# Patient Record
Sex: MALE | Race: WHITE | NOT HISPANIC OR LATINO | Employment: FULL TIME | URBAN - METROPOLITAN AREA
[De-identification: names, ages, dates, MRNs, and addresses within clinical notes are randomized per-mention and may not be internally consistent; named-entity substitution may affect disease eponyms.]

---

## 2023-06-02 PROBLEM — E66.9 OBESITY: Status: ACTIVE | Noted: 2023-06-02

## 2023-06-02 PROBLEM — E78.1 HIGH TRIGLYCERIDES: Status: ACTIVE | Noted: 2023-06-02

## 2023-06-02 PROBLEM — R73.01 IFG (IMPAIRED FASTING GLUCOSE): Status: ACTIVE | Noted: 2023-06-02

## 2023-06-02 PROBLEM — N20.0 URIC ACID KIDNEY STONE: Status: ACTIVE | Noted: 2023-06-02

## 2023-06-02 PROBLEM — I10 HTN (HYPERTENSION): Status: ACTIVE | Noted: 2023-06-02

## 2023-06-02 PROBLEM — L98.9 DISORDER OF SKIN OF UPPER EXTREMITY: Status: ACTIVE | Noted: 2023-06-02

## 2023-06-02 PROBLEM — K66.8: Status: ACTIVE | Noted: 2023-06-02

## 2023-06-02 PROBLEM — S61.032A PUNCTURE WOUND OF LEFT THUMB: Status: ACTIVE | Noted: 2023-06-02

## 2023-06-02 PROBLEM — N52.9 ERECTILE DYSFUNCTION: Status: ACTIVE | Noted: 2023-06-02

## 2023-06-02 PROBLEM — E55.9 VITAMIN D DEFICIENCY: Status: ACTIVE | Noted: 2023-06-02

## 2023-06-02 PROBLEM — L98.9 SKIN LESION OF LEFT ARM: Status: ACTIVE | Noted: 2023-06-02

## 2023-06-02 PROBLEM — M10.9 GOUT: Status: ACTIVE | Noted: 2023-06-02

## 2023-06-02 RX ORDER — ALLOPURINOL 100 MG/1
TABLET ORAL
COMMUNITY
Start: 2019-10-29 | End: 2023-06-07 | Stop reason: SDUPTHER

## 2023-06-02 RX ORDER — COLCHICINE 0.6 MG/1
TABLET ORAL
COMMUNITY
Start: 2019-12-03 | End: 2023-06-07 | Stop reason: SDUPTHER

## 2023-06-02 RX ORDER — SILDENAFIL 100 MG/1
TABLET, FILM COATED ORAL
COMMUNITY
End: 2024-06-07 | Stop reason: SINTOL

## 2023-06-02 RX ORDER — AMLODIPINE AND OLMESARTAN MEDOXOMIL 10; 40 MG/1; MG/1
TABLET ORAL
COMMUNITY
End: 2023-06-07 | Stop reason: SDUPTHER

## 2023-06-02 RX ORDER — METOPROLOL SUCCINATE 100 MG/1
TABLET, EXTENDED RELEASE ORAL
COMMUNITY
Start: 2020-10-23 | End: 2023-06-07 | Stop reason: SDUPTHER

## 2023-06-02 RX ORDER — ERGOCALCIFEROL 1.25 MG/1
CAPSULE ORAL
COMMUNITY
Start: 2023-01-08 | End: 2023-06-07 | Stop reason: SDUPTHER

## 2023-06-05 ENCOUNTER — APPOINTMENT (OUTPATIENT)
Dept: PRIMARY CARE | Facility: CLINIC | Age: 43
End: 2023-06-05
Payer: COMMERCIAL

## 2023-06-07 ENCOUNTER — TELEPHONE (OUTPATIENT)
Dept: PRIMARY CARE | Facility: CLINIC | Age: 43
End: 2023-06-07

## 2023-06-07 ENCOUNTER — OFFICE VISIT (OUTPATIENT)
Dept: PRIMARY CARE | Facility: CLINIC | Age: 43
End: 2023-06-07
Payer: COMMERCIAL

## 2023-06-07 VITALS
DIASTOLIC BLOOD PRESSURE: 72 MMHG | SYSTOLIC BLOOD PRESSURE: 110 MMHG | OXYGEN SATURATION: 96 % | HEIGHT: 73 IN | WEIGHT: 315 LBS | BODY MASS INDEX: 41.75 KG/M2 | HEART RATE: 72 BPM

## 2023-06-07 DIAGNOSIS — M10.9 GOUT, UNSPECIFIED CAUSE, UNSPECIFIED CHRONICITY, UNSPECIFIED SITE: ICD-10-CM

## 2023-06-07 DIAGNOSIS — K80.10 CALCULUS OF GALLBLADDER WITH CHOLECYSTITIS WITHOUT BILIARY OBSTRUCTION, UNSPECIFIED CHOLECYSTITIS ACUITY: Primary | ICD-10-CM

## 2023-06-07 DIAGNOSIS — E78.1 HIGH TRIGLYCERIDES: ICD-10-CM

## 2023-06-07 DIAGNOSIS — Z13.29 SCREENING FOR THYROID DISORDER: ICD-10-CM

## 2023-06-07 DIAGNOSIS — R14.0 ABDOMINAL BLOATING: ICD-10-CM

## 2023-06-07 DIAGNOSIS — R73.01 IFG (IMPAIRED FASTING GLUCOSE): ICD-10-CM

## 2023-06-07 DIAGNOSIS — E55.9 VITAMIN D DEFICIENCY: ICD-10-CM

## 2023-06-07 DIAGNOSIS — R10.11 RIGHT UPPER QUADRANT ABDOMINAL PAIN: ICD-10-CM

## 2023-06-07 DIAGNOSIS — R14.2 BELCHING: ICD-10-CM

## 2023-06-07 DIAGNOSIS — I10 HYPERTENSION, UNSPECIFIED TYPE: Primary | ICD-10-CM

## 2023-06-07 PROCEDURE — 1036F TOBACCO NON-USER: CPT | Performed by: NURSE PRACTITIONER

## 2023-06-07 PROCEDURE — 99214 OFFICE O/P EST MOD 30 MIN: CPT | Performed by: NURSE PRACTITIONER

## 2023-06-07 PROCEDURE — 3078F DIAST BP <80 MM HG: CPT | Performed by: NURSE PRACTITIONER

## 2023-06-07 PROCEDURE — 3074F SYST BP LT 130 MM HG: CPT | Performed by: NURSE PRACTITIONER

## 2023-06-07 RX ORDER — COLCHICINE 0.6 MG/1
0.6 TABLET ORAL 2 TIMES DAILY PRN
Qty: 30 TABLET | Refills: 1 | Status: SHIPPED | OUTPATIENT
Start: 2023-06-07 | End: 2024-06-07 | Stop reason: SDUPTHER

## 2023-06-07 RX ORDER — METOPROLOL SUCCINATE 100 MG/1
100 TABLET, EXTENDED RELEASE ORAL DAILY
Qty: 90 TABLET | Refills: 1 | Status: SHIPPED | OUTPATIENT
Start: 2023-06-07 | End: 2023-12-07 | Stop reason: SDUPTHER

## 2023-06-07 RX ORDER — ERGOCALCIFEROL 1.25 MG/1
50000 CAPSULE ORAL
Qty: 12 CAPSULE | Refills: 1 | Status: SHIPPED | OUTPATIENT
Start: 2023-06-07 | End: 2023-12-07 | Stop reason: SDUPTHER

## 2023-06-07 RX ORDER — AMLODIPINE AND OLMESARTAN MEDOXOMIL 10; 40 MG/1; MG/1
1 TABLET ORAL
Qty: 90 TABLET | Refills: 1 | Status: SHIPPED | OUTPATIENT
Start: 2023-06-07 | End: 2023-12-07 | Stop reason: SDUPTHER

## 2023-06-07 RX ORDER — ALLOPURINOL 100 MG/1
200 TABLET ORAL DAILY
Qty: 180 TABLET | Refills: 1 | Status: SHIPPED | OUTPATIENT
Start: 2023-06-07 | End: 2023-12-07 | Stop reason: SDUPTHER

## 2023-06-07 ASSESSMENT — ENCOUNTER SYMPTOMS
NEUROLOGICAL NEGATIVE: 1
CONSTITUTIONAL NEGATIVE: 1
CARDIOVASCULAR NEGATIVE: 1
RESPIRATORY NEGATIVE: 1
PSYCHIATRIC NEGATIVE: 1
ROS GI COMMENTS: AS NOTED IN HPI
MUSCULOSKELETAL NEGATIVE: 1

## 2023-06-07 NOTE — PATIENT INSTRUCTIONS
Labs ordered for next six month appointment- fasting 10-12 hours, do drink water  Recommend low fat diet.   US Gallbladder ordered-> will communicate results and determine the next step

## 2023-06-07 NOTE — TELEPHONE ENCOUNTER
Pharmacy called requesting return phone call. They needed clarification on how pt is to take his rx colchicine. There are 2 different sets of directions? Previously he took 2 tabs at onset but directions are now bid, prn. I can call the pharmacy back to let them know.

## 2023-06-07 NOTE — PROGRESS NOTES
"Subjective   Patient ID: Vitaliy Acosta is a 42 y.o. male who presents for Follow-up (htn) and Med Refill.    HPI   Patient here for routine follow up. Last seen on 12/30/2022.  Current concerns:  1) Sunday night- three days ago. Felt \"like crap\" right sided upper abdominal- under rib of chest pain, did fall asleep, woke up shivering, had headache- went on for 2-3 days then just went away. This was the second time happened, Had happened 4-5 months ago as well. Having increase in bloating abdomin, belching, Does not watch diet- although stopped eating fast food also 4-5 months- d/t cost.  Denies nausea, unsure of any change in BM- never really notices.   Chronic Concerns: Hypertension, Uric acid kidney stones, ED, Gout, Vitamin D deficiency.  Specialist- None  Labs: 12/30/2022  NON SMOKER    Review of Systems   Constitutional: Negative.    HENT: Negative.     Respiratory: Negative.     Cardiovascular: Negative.    Gastrointestinal:         As noted in HPI   Genitourinary: Negative.    Musculoskeletal: Negative.    Neurological: Negative.    Psychiatric/Behavioral: Negative.         Objective   /72 (BP Location: Left arm, Patient Position: Sitting, BP Cuff Size: Large adult)   Pulse 72   Ht 1.854 m (6' 1\")   Wt 145 kg (319 lb)   SpO2 96%   BMI 42.09 kg/m²     Physical Exam  Vitals reviewed.   Constitutional:       Appearance: He is obese.   HENT:      Right Ear: Tympanic membrane, ear canal and external ear normal.      Left Ear: Tympanic membrane, ear canal and external ear normal.      Nose: Nose normal.      Mouth/Throat:      Mouth: Mucous membranes are moist.   Neck:      Thyroid: No thyromegaly.      Vascular: No carotid bruit.   Cardiovascular:      Rate and Rhythm: Normal rate and regular rhythm.      Heart sounds: Normal heart sounds.   Pulmonary:      Effort: Pulmonary effort is normal.      Breath sounds: Normal breath sounds.   Abdominal:      General: Bowel sounds are normal.      Palpations: " Abdomen is soft.      Tenderness: There is abdominal tenderness (deep palpation right upper quadrant- slight tenderness- ambiguous Chavez's sign).      Comments: Limited abdominal exam d/t body habitus.    Musculoskeletal:         General: Normal range of motion.      Cervical back: Normal range of motion and neck supple.   Skin:     General: Skin is warm.   Neurological:      General: No focal deficit present.      Mental Status: He is alert.   Psychiatric:         Mood and Affect: Mood normal.         Behavior: Behavior normal.         Judgment: Judgment normal.       Assessment/Plan   Diagnoses and all orders for this visit:  Health Maintenance  Labs- 12/30/2022- orders placed for next six month labs (12/2023)  Influenza- declines  Prevnar 13/20- not indicated   Shingrix- not indicated   Colonoscopy- screening will be advised at age 45  PSA - not indicated at this time  NON SMOKER    Hypertension, unspecified type- well controlled   Refilled Amlodipine-Olmesartan 10-40 mg once daily & Metoprolol Succinated  mg once daily  -     Comprehensive Metabolic Panel; Future  -     Albumin , Urine Random; Future  -     CBC and Auto Differential; Future  -     metoprolol succinate XL (Toprol-XL) 100 mg 24 hr tablet; Take 1 tablet (100 mg) by mouth once daily.  Gout, unspecified cause, unspecified chronicity, unspecified site  -     Uric acid; Future  -     colchicine 0.6 mg tablet; Take 1 tablet (0.6 mg) by mouth 2 times a day as needed for muscle/joint pain. Take two tablets at onset of symptoms and one tablet one hour later.  -     allopurinol (Zyloprim) 100 mg tablet; Take 2 tablets (200 mg) by mouth once daily.  Right upper quadrant abdominal pain / Belching/ Abdominal bloating  - Recommended low fat diet  -     US gallbladder; Future  IFG (impaired fasting glucose)  -     Comprehensive Metabolic Panel; Future  -     Hemoglobin A1C; Future  High triglycerides  -     Lipid Panel; Future  -     CBC and Auto  Differential; Future  Screening for thyroid disorder  -     TSH with reflex to Free T4 if abnormal; Future  Vitamin D deficiency  -     Vitamin D 1,25 Dihydroxy; Future  -     ergocalciferol (Vitamin D-2) 1.25 MG (34403 UT) capsule; Take 1 capsule (50,000 Units) by mouth 1 (one) time per week.    PLAN/FOLLOW UP SIX MONTHS  Labs to be reviewed at Follow up  US gallbladder, pending results determine if HIDA scan, referral to General Surgeon etc ?

## 2023-08-14 ENCOUNTER — OFFICE VISIT (OUTPATIENT)
Dept: PRIMARY CARE | Facility: CLINIC | Age: 43
End: 2023-08-14
Payer: COMMERCIAL

## 2023-08-14 VITALS
HEART RATE: 61 BPM | DIASTOLIC BLOOD PRESSURE: 72 MMHG | HEIGHT: 73 IN | SYSTOLIC BLOOD PRESSURE: 124 MMHG | OXYGEN SATURATION: 98 % | WEIGHT: 315 LBS | BODY MASS INDEX: 41.75 KG/M2

## 2023-08-14 DIAGNOSIS — Q67.8 CHEST ASYMMETRY: ICD-10-CM

## 2023-08-14 DIAGNOSIS — K80.10 CALCULUS OF GALLBLADDER WITH CHRONIC CHOLECYSTITIS WITHOUT OBSTRUCTION: Primary | ICD-10-CM

## 2023-08-14 DIAGNOSIS — D17.1 LIPOMA OF TORSO: ICD-10-CM

## 2023-08-14 DIAGNOSIS — K21.9 GASTROESOPHAGEAL REFLUX DISEASE, UNSPECIFIED WHETHER ESOPHAGITIS PRESENT: ICD-10-CM

## 2023-08-14 PROBLEM — R14.2 BURPING: Status: ACTIVE | Noted: 2023-08-14

## 2023-08-14 PROBLEM — K76.0 STEATOSIS OF LIVER: Status: ACTIVE | Noted: 2023-08-14

## 2023-08-14 PROBLEM — R14.0 ABDOMINAL BLOATING: Status: ACTIVE | Noted: 2023-08-14

## 2023-08-14 PROBLEM — R10.11 RIGHT UPPER QUADRANT ABDOMINAL PAIN: Status: ACTIVE | Noted: 2023-08-14

## 2023-08-14 PROCEDURE — 99213 OFFICE O/P EST LOW 20 MIN: CPT | Performed by: NURSE PRACTITIONER

## 2023-08-14 PROCEDURE — 3078F DIAST BP <80 MM HG: CPT | Performed by: NURSE PRACTITIONER

## 2023-08-14 PROCEDURE — 3074F SYST BP LT 130 MM HG: CPT | Performed by: NURSE PRACTITIONER

## 2023-08-14 PROCEDURE — 1036F TOBACCO NON-USER: CPT | Performed by: NURSE PRACTITIONER

## 2023-08-14 RX ORDER — OMEPRAZOLE 20 MG/1
20 CAPSULE, DELAYED RELEASE ORAL 2 TIMES DAILY
Qty: 60 CAPSULE | Refills: 0 | Status: SHIPPED | OUTPATIENT
Start: 2023-08-14 | End: 2023-08-23 | Stop reason: SDUPTHER

## 2023-08-14 ASSESSMENT — ENCOUNTER SYMPTOMS
VOMITING: 0
ROS SKIN COMMENTS: AS NOTED IN HPI
DIARRHEA: 0
NAUSEA: 0
CARDIOVASCULAR NEGATIVE: 1
CONSTITUTIONAL NEGATIVE: 1
CONSTIPATION: 0
RESPIRATORY NEGATIVE: 1
ROS GI COMMENTS: AS NOTED IN HPI

## 2023-08-14 ASSESSMENT — PAIN SCALES - GENERAL: PAINLEVEL: 0-NO PAIN

## 2023-08-14 NOTE — PROGRESS NOTES
"Subjective   Patient ID: Vitaliy Acosta is a 43 y.o. male who presents for Abdominal Pain (Inflammation. Pt had an [place]. done 1.5 month ago done and was informed he does have gall stones and was informed to see a surgeon. HE has been trying to follow up a low fat diet. /LOV 6/7/23 /NOV 12/7/23 ).    HPI   Patient here for ongoing concern of abdominal pain. Last seen on 06/07/2023.  Current concern:   1) abdominal pain, US completed 06/07/2023 with a single calcified gallstone, no gallbladder wall thickening.   He did have consultation with General Surgeon Dr. Colin on 06/28/2023 and surgery was discussed. She also felt he would benefit from a PPI or acid reducer for his stomach.  Has been trying to eat low fat diet and not seeing much improvement in symptoms.   2) left sided \"swelling lower rib cage\", bending causes tenderness.  At times it seems larger than \"normal\" and radiated up to left arm-pit for a little while then just resolved on his own, denies any injury or change in color of skin. Has not done anything for it. Just concerned because it is same side of his heart.   Chronic concerns: HTN, uric acid kidney stone , ED, Gout, vitamin D deficiency  Specialist- None  Labs 12/30/2022    Review of Systems   Constitutional: Negative.    Respiratory: Negative.     Cardiovascular: Negative.    Gastrointestinal:  Negative for constipation, diarrhea, nausea and vomiting.        As noted in HPI   Genitourinary: Negative.    Musculoskeletal:         As noted in HPI    Skin:         As noted in HPI       Objective   /72 (BP Location: Left arm, Patient Position: Sitting, BP Cuff Size: Large adult)   Pulse 61   Ht 1.854 m (6' 1\")   Wt 144 kg (317 lb 3.2 oz)   SpO2 98%   BMI 41.85 kg/m²     Physical Exam  Vitals reviewed.   Constitutional:       Appearance: He is obese.   Pulmonary:      Effort: Pulmonary effort is normal.   Chest:      Chest wall: Tenderness (to deep palpation tender) present.   Breasts:     " Right: Normal.      Left: Normal.          Comments: As noted in Diagram palpable rubbery tissue approximately  4 cm high, unable to determine length consistency of lipoma.  No change in skin color or texture within this area.    Musculoskeletal:         General: Normal range of motion.   Lymphadenopathy:      Upper Body:      Left upper body: No supraclavicular, axillary or pectoral adenopathy.   Skin:     General: Skin is warm.   Neurological:      General: No focal deficit present.      Mental Status: He is alert.      Coordination: Coordination is intact.      Gait: Gait is intact.   Psychiatric:         Mood and Affect: Mood normal.         Judgment: Judgment normal.       Assessment/Plan     Diagnoses and all orders for this visit:  Calculus of gallbladder with chronic cholecystitis without obstruction / Gastroesophageal reflux disease, unspecified whether esophagitis present  - initiated  omeprazole (PriLOSEC) 20 mg DR capsule; Take 1 capsule (20 mg) by mouth 2 times a day. Do not crush or chew.  He is to communicate through My Chart if inflammation pain improves with use of Omeprazole, will continue on this medication with plan to decrease to once daily.  Discussed with Vitaliy that likely will need gallbladder surgery since he is having trouble maintaining low fat diet and it does not seem to help his symptoms at this time.   Lipoma of torso /  Chest asymmetry- differential diagnosis lipoma vs rib displacement, likely lipoma based on exam  Discussed lipoma and provided AVS information  Plan: follow up as scheduled in December for routine refills   * communicate through My Chart if gastric inflammation pain improves with use of Omeprazole, will continue on this medication with plan to decrease to once daily.

## 2023-08-14 NOTE — PATIENT INSTRUCTIONS
Omeprazole currently 2 x day for 30 days, the plan is to decrease the dose after 30 days to once a day if needing to stay on this medication.

## 2023-08-23 DIAGNOSIS — K21.9 GASTROESOPHAGEAL REFLUX DISEASE, UNSPECIFIED WHETHER ESOPHAGITIS PRESENT: ICD-10-CM

## 2023-08-23 RX ORDER — OMEPRAZOLE 40 MG/1
40 CAPSULE, DELAYED RELEASE ORAL
Qty: 90 CAPSULE | Refills: 1 | Status: SHIPPED | OUTPATIENT
Start: 2023-08-23 | End: 2023-12-07 | Stop reason: SDUPTHER

## 2023-12-07 ENCOUNTER — OFFICE VISIT (OUTPATIENT)
Dept: PRIMARY CARE | Facility: CLINIC | Age: 43
End: 2023-12-07
Payer: COMMERCIAL

## 2023-12-07 VITALS
HEIGHT: 73 IN | DIASTOLIC BLOOD PRESSURE: 84 MMHG | TEMPERATURE: 97.1 F | BODY MASS INDEX: 41.75 KG/M2 | WEIGHT: 315 LBS | HEART RATE: 64 BPM | OXYGEN SATURATION: 94 % | SYSTOLIC BLOOD PRESSURE: 138 MMHG

## 2023-12-07 DIAGNOSIS — Z12.11 ENCOUNTER FOR SCREENING FOR MALIGNANT NEOPLASM OF COLON: ICD-10-CM

## 2023-12-07 DIAGNOSIS — M10.9 GOUT, UNSPECIFIED CAUSE, UNSPECIFIED CHRONICITY, UNSPECIFIED SITE: ICD-10-CM

## 2023-12-07 DIAGNOSIS — E78.1 HIGH TRIGLYCERIDES: ICD-10-CM

## 2023-12-07 DIAGNOSIS — I10 HYPERTENSION, UNSPECIFIED TYPE: Primary | ICD-10-CM

## 2023-12-07 DIAGNOSIS — E55.9 VITAMIN D DEFICIENCY: ICD-10-CM

## 2023-12-07 DIAGNOSIS — K21.9 GASTROESOPHAGEAL REFLUX DISEASE, UNSPECIFIED WHETHER ESOPHAGITIS PRESENT: ICD-10-CM

## 2023-12-07 DIAGNOSIS — K76.0 STEATOSIS OF LIVER: ICD-10-CM

## 2023-12-07 PROCEDURE — 99214 OFFICE O/P EST MOD 30 MIN: CPT | Performed by: NURSE PRACTITIONER

## 2023-12-07 PROCEDURE — 3075F SYST BP GE 130 - 139MM HG: CPT | Performed by: NURSE PRACTITIONER

## 2023-12-07 PROCEDURE — 1036F TOBACCO NON-USER: CPT | Performed by: NURSE PRACTITIONER

## 2023-12-07 PROCEDURE — 3079F DIAST BP 80-89 MM HG: CPT | Performed by: NURSE PRACTITIONER

## 2023-12-07 RX ORDER — ALLOPURINOL 100 MG/1
200 TABLET ORAL DAILY
Qty: 180 TABLET | Refills: 1 | Status: SHIPPED | OUTPATIENT
Start: 2023-12-07 | End: 2024-05-29 | Stop reason: SDUPTHER

## 2023-12-07 RX ORDER — AMLODIPINE AND OLMESARTAN MEDOXOMIL 10; 40 MG/1; MG/1
1 TABLET ORAL
Qty: 90 TABLET | Refills: 1 | Status: SHIPPED | OUTPATIENT
Start: 2023-12-07 | End: 2024-05-29 | Stop reason: SDUPTHER

## 2023-12-07 RX ORDER — OMEPRAZOLE 20 MG/1
20 CAPSULE, DELAYED RELEASE ORAL
Qty: 90 CAPSULE | Refills: 1 | Status: SHIPPED | OUTPATIENT
Start: 2023-12-07 | End: 2024-06-07 | Stop reason: ALTCHOICE

## 2023-12-07 RX ORDER — ERGOCALCIFEROL 1.25 MG/1
50000 CAPSULE ORAL
Qty: 12 CAPSULE | Refills: 1 | Status: SHIPPED | OUTPATIENT
Start: 2023-12-07 | End: 2024-05-29 | Stop reason: SDUPTHER

## 2023-12-07 RX ORDER — METOPROLOL SUCCINATE 100 MG/1
100 TABLET, EXTENDED RELEASE ORAL DAILY
Qty: 90 TABLET | Refills: 1 | Status: SHIPPED | OUTPATIENT
Start: 2023-12-07 | End: 2024-05-29 | Stop reason: SDUPTHER

## 2023-12-07 ASSESSMENT — ENCOUNTER SYMPTOMS
CONSTITUTIONAL NEGATIVE: 1
PSYCHIATRIC NEGATIVE: 1
RESPIRATORY NEGATIVE: 1
CARDIOVASCULAR NEGATIVE: 1

## 2023-12-07 NOTE — PROGRESS NOTES
"Subjective   Patient ID: Vitaliy Acosta is a 43 y.o. male who presents for Follow-up.    HPI   Patient here for 6 month follow up. Last seen on 08/14/2023- acute concern kidney stones.   Last gout flare-up September, stress eats  Current concern.   1) insurance did not cover Omeprazole so he stopped taking, now using TUMs must    Chronic concerns: HTN, uric acid kidney stone , ED, Gout, vitamin D deficiency  Specialist- None  Labs 12/30/2022- did not complete labs for this appt.     Review of Systems   Constitutional: Negative.    Respiratory: Negative.     Cardiovascular: Negative.    Gastrointestinal:         GERD intermittent with discontinued use of Omeprazole, using TUMS intermittently without significant relief.    Genitourinary: Negative.    Musculoskeletal:         Gout as noted in HPI   Psychiatric/Behavioral: Negative.         Objective   /84   Pulse 64   Temp 36.2 °C (97.1 °F)   Ht 1.854 m (6' 1\")   Wt 146 kg (321 lb)   SpO2 94%   BMI 42.35 kg/m²   Weight in 08/2023 319 lbs.     Physical Exam  Vitals reviewed.   Constitutional:       Appearance: He is obese.   HENT:      Right Ear: Tympanic membrane and ear canal normal.      Left Ear: Tympanic membrane and ear canal normal.      Mouth/Throat:      Mouth: Mucous membranes are moist.   Cardiovascular:      Rate and Rhythm: Normal rate and regular rhythm.      Heart sounds: Normal heart sounds.   Pulmonary:      Effort: Pulmonary effort is normal.      Breath sounds: Normal breath sounds.   Skin:     General: Skin is warm.   Neurological:      General: No focal deficit present.      Mental Status: He is alert.   Psychiatric:         Mood and Affect: Mood normal.         Behavior: Behavior normal.         Judgment: Judgment normal.       Assessment/Plan   Labs- 12/30/2022   Influenza- declines  Prevnar 13/20- not indicated   Shingrix- not indicated   Colonoscopy- referral to General Surgery for screening colonoscopy  PSA - not indicated at this " time  NON SMOKER  Diagnoses and all orders for this visit:  Hypertension, unspecified type- Blood pressure acceptable  -     amlodipine-olmesartan (Lucille) 10-40 mg tablet; Take 1 tablet by mouth once daily.  -     metoprolol succinate XL (Toprol-XL) 100 mg 24 hr tablet; Take 1 tablet (100 mg) by mouth once daily.  Gout, unspecified cause, unspecified chronicity, unspecified site  -     allopurinol (Zyloprim) 100 mg tablet; Take 2 tablets (200 mg) by mouth once daily.  Gastroesophageal reflux disease, unspecified whether esophagitis present  -     omeprazole (PriLOSEC) 20 mg DR capsule; Take 1 capsule (20 mg) by mouth once daily in the morning. Take before meals. Do not crush or chew.  Steatosis of liver  Vitamin D deficiency  -     ergocalciferol (Vitamin D-2) 1.25 MG (91199 UT) capsule; Take 1 capsule (50,000 Units) by mouth 1 (one) time per week.  High triglycerides- no labs  to review  Encounter for screening for malignant neoplasm of colon  -     Referral to General Surgery; Future  Plan: Follow up 6 months  Call if heartburn not improved with restart Omeprazole, plan on EGD when colonoscopy scheduled

## 2023-12-07 NOTE — PATIENT INSTRUCTIONS
Please complete Labs: fasting 10-12 hours do not eat, please drink adequate water prior to lab draw.    Referral for screening colonoscopy, schedule when possible.   Omeprazole 20 mg once daily, if not effective please let me know and will increase dose and plan on upper EGD when you plan on the colonoscopy (as we discussed)

## 2023-12-27 ENCOUNTER — LAB (OUTPATIENT)
Dept: LAB | Facility: LAB | Age: 43
End: 2023-12-27
Payer: COMMERCIAL

## 2023-12-27 DIAGNOSIS — Z13.29 SCREENING FOR THYROID DISORDER: ICD-10-CM

## 2023-12-27 DIAGNOSIS — E78.1 HIGH TRIGLYCERIDES: ICD-10-CM

## 2023-12-27 DIAGNOSIS — I10 HYPERTENSION, UNSPECIFIED TYPE: ICD-10-CM

## 2023-12-27 DIAGNOSIS — R73.01 IFG (IMPAIRED FASTING GLUCOSE): ICD-10-CM

## 2023-12-27 DIAGNOSIS — M10.9 GOUT, UNSPECIFIED CAUSE, UNSPECIFIED CHRONICITY, UNSPECIFIED SITE: ICD-10-CM

## 2023-12-27 DIAGNOSIS — E55.9 VITAMIN D DEFICIENCY: ICD-10-CM

## 2023-12-27 LAB
ALBUMIN SERPL BCP-MCNC: 4.4 G/DL (ref 3.4–5)
ALP SERPL-CCNC: 56 U/L (ref 33–120)
ALT SERPL W P-5'-P-CCNC: 52 U/L (ref 10–52)
ANION GAP SERPL CALC-SCNC: 11 MMOL/L (ref 10–20)
AST SERPL W P-5'-P-CCNC: 45 U/L (ref 9–39)
BASOPHILS # BLD AUTO: 0.05 X10*3/UL (ref 0–0.1)
BASOPHILS NFR BLD AUTO: 0.7 %
BILIRUB SERPL-MCNC: 1 MG/DL (ref 0–1.2)
BUN SERPL-MCNC: 14 MG/DL (ref 6–23)
CALCIUM SERPL-MCNC: 9.4 MG/DL (ref 8.6–10.3)
CHLORIDE SERPL-SCNC: 107 MMOL/L (ref 98–107)
CHOLEST SERPL-MCNC: 137 MG/DL (ref 0–199)
CHOLESTEROL/HDL RATIO: 4.3
CO2 SERPL-SCNC: 23 MMOL/L (ref 21–32)
CREAT SERPL-MCNC: 0.97 MG/DL (ref 0.5–1.3)
EOSINOPHIL # BLD AUTO: 0.11 X10*3/UL (ref 0–0.7)
EOSINOPHIL NFR BLD AUTO: 1.6 %
ERYTHROCYTE [DISTWIDTH] IN BLOOD BY AUTOMATED COUNT: 13.2 % (ref 11.5–14.5)
GFR SERPL CREATININE-BSD FRML MDRD: >90 ML/MIN/1.73M*2
GLUCOSE SERPL-MCNC: 109 MG/DL (ref 74–99)
HCT VFR BLD AUTO: 45.7 % (ref 41–52)
HDLC SERPL-MCNC: 31.6 MG/DL
HGB BLD-MCNC: 15.8 G/DL (ref 13.5–17.5)
IMM GRANULOCYTES # BLD AUTO: 0.02 X10*3/UL (ref 0–0.7)
IMM GRANULOCYTES NFR BLD AUTO: 0.3 % (ref 0–0.9)
LDLC SERPL CALC-MCNC: 78 MG/DL
LYMPHOCYTES # BLD AUTO: 1.64 X10*3/UL (ref 1.2–4.8)
LYMPHOCYTES NFR BLD AUTO: 24.2 %
MCH RBC QN AUTO: 31 PG (ref 26–34)
MCHC RBC AUTO-ENTMCNC: 34.6 G/DL (ref 32–36)
MCV RBC AUTO: 90 FL (ref 80–100)
MONOCYTES # BLD AUTO: 0.53 X10*3/UL (ref 0.1–1)
MONOCYTES NFR BLD AUTO: 7.8 %
NEUTROPHILS # BLD AUTO: 4.44 X10*3/UL (ref 1.2–7.7)
NEUTROPHILS NFR BLD AUTO: 65.4 %
NON HDL CHOLESTEROL: 105 MG/DL (ref 0–149)
NRBC BLD-RTO: 0 /100 WBCS (ref 0–0)
PLATELET # BLD AUTO: 187 X10*3/UL (ref 150–450)
POTASSIUM SERPL-SCNC: 3.9 MMOL/L (ref 3.5–5.3)
PROT SERPL-MCNC: 6.7 G/DL (ref 6.4–8.2)
RBC # BLD AUTO: 5.1 X10*6/UL (ref 4.5–5.9)
SODIUM SERPL-SCNC: 137 MMOL/L (ref 136–145)
TRIGL SERPL-MCNC: 137 MG/DL (ref 0–149)
TSH SERPL-ACNC: 1.04 MIU/L (ref 0.44–3.98)
URATE SERPL-MCNC: 8.5 MG/DL (ref 4–7.5)
VLDL: 27 MG/DL (ref 0–40)
WBC # BLD AUTO: 6.8 X10*3/UL (ref 4.4–11.3)

## 2023-12-27 PROCEDURE — 83036 HEMOGLOBIN GLYCOSYLATED A1C: CPT

## 2023-12-27 PROCEDURE — 82652 VIT D 1 25-DIHYDROXY: CPT

## 2023-12-27 PROCEDURE — 80061 LIPID PANEL: CPT

## 2023-12-27 PROCEDURE — 85025 COMPLETE CBC W/AUTO DIFF WBC: CPT

## 2023-12-27 PROCEDURE — 84550 ASSAY OF BLOOD/URIC ACID: CPT

## 2023-12-27 PROCEDURE — 84443 ASSAY THYROID STIM HORMONE: CPT

## 2023-12-27 PROCEDURE — 80053 COMPREHEN METABOLIC PANEL: CPT

## 2023-12-27 PROCEDURE — 36415 COLL VENOUS BLD VENIPUNCTURE: CPT

## 2023-12-28 LAB
EST. AVERAGE GLUCOSE BLD GHB EST-MCNC: 134 MG/DL
HBA1C MFR BLD: 6.3 %

## 2023-12-29 LAB — 1,25(OH)2D3 SERPL-MCNC: 77.5 PG/ML (ref 19.9–79.3)

## 2024-01-02 ENCOUNTER — OFFICE VISIT (OUTPATIENT)
Dept: PRIMARY CARE | Facility: CLINIC | Age: 44
End: 2024-01-02
Payer: COMMERCIAL

## 2024-01-02 VITALS
DIASTOLIC BLOOD PRESSURE: 78 MMHG | HEART RATE: 56 BPM | OXYGEN SATURATION: 97 % | WEIGHT: 315 LBS | BODY MASS INDEX: 41.75 KG/M2 | HEIGHT: 73 IN | SYSTOLIC BLOOD PRESSURE: 118 MMHG

## 2024-01-02 DIAGNOSIS — R73.03 PREDIABETES: ICD-10-CM

## 2024-01-02 DIAGNOSIS — M10.9 GOUT, UNSPECIFIED CAUSE, UNSPECIFIED CHRONICITY, UNSPECIFIED SITE: ICD-10-CM

## 2024-01-02 DIAGNOSIS — R10.32 LEFT LOWER QUADRANT ABDOMINAL PAIN: ICD-10-CM

## 2024-01-02 DIAGNOSIS — R19.7 DIARRHEA, UNSPECIFIED TYPE: Primary | ICD-10-CM

## 2024-01-02 PROCEDURE — 3074F SYST BP LT 130 MM HG: CPT | Performed by: NURSE PRACTITIONER

## 2024-01-02 PROCEDURE — 1036F TOBACCO NON-USER: CPT | Performed by: NURSE PRACTITIONER

## 2024-01-02 PROCEDURE — 99213 OFFICE O/P EST LOW 20 MIN: CPT | Performed by: NURSE PRACTITIONER

## 2024-01-02 PROCEDURE — 3078F DIAST BP <80 MM HG: CPT | Performed by: NURSE PRACTITIONER

## 2024-01-02 ASSESSMENT — ENCOUNTER SYMPTOMS
BLOOD IN STOOL: 0
DIARRHEA: 1
CHILLS: 0
MUSCULOSKELETAL NEGATIVE: 1
FEVER: 0
NAUSEA: 0
DIAPHORESIS: 0
ABDOMINAL DISTENTION: 0
CARDIOVASCULAR NEGATIVE: 1
RESPIRATORY NEGATIVE: 1
RECTAL PAIN: 0
UNEXPECTED WEIGHT CHANGE: 1
APPETITE CHANGE: 1

## 2024-01-02 NOTE — PATIENT INSTRUCTIONS
Recommend probiotic (over the counter) Florester as directed on label  Limited fatty foods, red sauce, Follow a bland diet- applesauce, rice, toast.   If diarrhea continues or returns please call office office and I will order stool studies.  Uric  acid lab repeat end of March- order in EMR.

## 2024-01-02 NOTE — PROGRESS NOTES
"Subjective   Patient ID: Vitaliy Acosta is a 43 y.o. male who presents for OTHER (Stomach/bladder issues).    HPI   Patient here for ongoing acute concern of stomach/bladder issues. Last office visit on 12/07/2023.   Current concern:  1) Katelyn santos (8 days ago) had \"constant diarrhea with  left sided abdominal \"discomfort- like an internal itch or a pulling feeling\" Used Imodium to help ease-up the diarrhea which did help although when he discontinued use, the diarrhea returned.  Unable to eat any solid foods at that time, able to drink water, Gatorade. Denied cramping, nausea. Today reports able to eat vegetables, water, He also discontinued caffeine. Last BM yesterday am, loose mucous stool.  At his appt on 12/07 rx Omeprazole, had used TUMs as needed piror. He did discontinue use once symptoms started. Has not made appointment with  General surgery for screening colonoscopy as referred from 12/07/2023.   Chronic concerns: HTN, uric acid kidney stone , ED, Gout, vitamin D deficiency, Prediabetes,   Specialist- None  Labs : 12/27/2023     Review of Systems   Constitutional:  Positive for appetite change and unexpected weight change (loss of 5 lbs since 12/07/2023 appt.). Negative for chills, diaphoresis and fever.   HENT: Negative.     Respiratory: Negative.     Cardiovascular: Negative.    Gastrointestinal:  Positive for diarrhea. Negative for abdominal distention, blood in stool, nausea and rectal pain.   Genitourinary: Negative.         Reported with diarrhea had decreased urination    Musculoskeletal: Negative.      Objective   /78 (BP Location: Right arm, Patient Position: Sitting)   Pulse 56   Ht 1.854 m (6' 1\")   Wt 143 kg (316 lb)   SpO2 97%   BMI 41.69 kg/m²   Weight 321 lbs at 12/07/23 appt.  Loss of 5 lbs.     Physical Exam  Vitals reviewed.   Constitutional:       Appearance: He is obese.   Pulmonary:      Effort: Pulmonary effort is normal.   Abdominal:      General: Bowel sounds are " decreased.      Tenderness: There is no abdominal tenderness.      Comments: BS decreased in lower bilateral quadrants, very diminished in Upper bilateral quadrants.    Musculoskeletal:         General: Normal range of motion.   Neurological:      General: No focal deficit present.      Mental Status: He is alert.   Psychiatric:         Mood and Affect: Mood normal.       Assessment/Plan   Labs- 12/27/2023- discussed with patient    Influenza- declines  Prevnar 13/20- not indicated   Shingrix- not indicated   Colonoscopy- referral to General Surgery for screening colonoscopy  PSA - not indicated at this time  NON SMOKER  Diagnoses and all orders for this visit:  Diarrhea, unspecified type / Left lower quadrant abdominal pain: differential dx : diverticulosis vs viral vs gallbladder   Schedule with General surgery for screening colonoscopy soon.   Recommend probiotic (over the counter) Florester as directed on label  Limited fatty foods, red sauce, Follow a bland diet- applesauce, rice, toast. AVS provided bland diet information   If diarrhea continues or returns please call office office and I will order stool studies.  Prediabetes  A1C 6.3, discussed diet, weight loss, exercise to help keep this level in check.   Gout, unspecified cause, unspecified chronicity, unspecified site  Uric acid level 8.5, denies any ongoing flare-ups. Currently on Allopurinol 200 mg, discussed increasing dose if needed, declined   Repeat Uric acid in 3-4 months to determine if level remains elevated or any flare-up will need to increase Allopurinol doseage.   -     Uric acid; Future (plan on repeat  end of March for monitoring)     PLAN: follow up as scheduled June at this time,   If diarrhea returns plan on stool studies  Schedule with General Surgery for screening colonoscopy

## 2024-01-09 ENCOUNTER — APPOINTMENT (OUTPATIENT)
Dept: SURGERY | Facility: CLINIC | Age: 44
End: 2024-01-09
Payer: COMMERCIAL

## 2024-01-11 ENCOUNTER — OFFICE VISIT (OUTPATIENT)
Dept: SURGERY | Facility: CLINIC | Age: 44
End: 2024-01-11
Payer: COMMERCIAL

## 2024-01-11 VITALS
HEIGHT: 73 IN | WEIGHT: 311.8 LBS | OXYGEN SATURATION: 95 % | BODY MASS INDEX: 41.32 KG/M2 | HEART RATE: 90 BPM | DIASTOLIC BLOOD PRESSURE: 95 MMHG | SYSTOLIC BLOOD PRESSURE: 143 MMHG

## 2024-01-11 DIAGNOSIS — Z12.11 ENCOUNTER FOR SCREENING FOR MALIGNANT NEOPLASM OF COLON: Primary | ICD-10-CM

## 2024-01-11 PROCEDURE — 1036F TOBACCO NON-USER: CPT | Performed by: SURGERY

## 2024-01-11 PROCEDURE — 99214 OFFICE O/P EST MOD 30 MIN: CPT | Performed by: SURGERY

## 2024-01-11 PROCEDURE — 3077F SYST BP >= 140 MM HG: CPT | Performed by: SURGERY

## 2024-01-11 PROCEDURE — 3080F DIAST BP >= 90 MM HG: CPT | Performed by: SURGERY

## 2024-01-11 RX ORDER — SODIUM CHLORIDE 9 MG/ML
20 INJECTION, SOLUTION INTRAVENOUS CONTINUOUS
Status: CANCELLED | OUTPATIENT
Start: 2024-01-11

## 2024-01-11 RX ORDER — POLYETHYLENE GLYCOL 3350 17 G/17G
238 POWDER, FOR SOLUTION ORAL ONCE
Qty: 238 G | Refills: 0 | Status: SHIPPED | OUTPATIENT
Start: 2024-01-11 | End: 2024-01-11

## 2024-01-11 ASSESSMENT — ENCOUNTER SYMPTOMS
PALPITATIONS: 0
FEVER: 0
NAUSEA: 0
SHORTNESS OF BREATH: 0
ABDOMINAL PAIN: 0
DIARRHEA: 0
VOMITING: 0
COUGH: 0
CONSTIPATION: 0
HEADACHES: 0
CHILLS: 0
BLOOD IN STOOL: 0
DIZZINESS: 0

## 2024-01-11 NOTE — PROGRESS NOTES
GENERAL SURGERY CLINIC NOTE    Vitaliy Acosta   1980   [id number]     History Of Present Illness  Vitaliy Acosta is a 43 y.o. male who presents to the office for evaluation for his first colonoscopy. He saw Dr. Tom for cholelithiasis last year and is currently asymptomatic from that standpoint. He had diarrhea between Christmas and New Year but that has improved.     Past Medical History  He has a past medical history of Essential (primary) hypertension, Other specified disorders of peritoneum, Personal history of other diseases of the digestive system, and Personal history of urinary calculi.    Surgical History  He has a past surgical history that includes Other surgical history (03/09/2020) and Other surgical history (03/09/2020).  No abdominal surgeries    Medications  Current Outpatient Medications on File Prior to Visit   Medication Sig Dispense Refill    allopurinol (Zyloprim) 100 mg tablet Take 2 tablets (200 mg) by mouth once daily. 180 tablet 1    amlodipine-olmesartan (Lucille) 10-40 mg tablet Take 1 tablet by mouth once daily. 90 tablet 1    colchicine 0.6 mg tablet Take 1 tablet (0.6 mg) by mouth 2 times a day as needed for muscle/joint pain. Take two tablets at onset of symptoms and one tablet one hour later. 30 tablet 1    ergocalciferol (Vitamin D-2) 1.25 MG (29421 UT) capsule Take 1 capsule (50,000 Units) by mouth 1 (one) time per week. 12 capsule 1    metoprolol succinate XL (Toprol-XL) 100 mg 24 hr tablet Take 1 tablet (100 mg) by mouth once daily. 90 tablet 1    omeprazole (PriLOSEC) 20 mg DR capsule Take 1 capsule (20 mg) by mouth once daily in the morning. Take before meals. Do not crush or chew. (Patient not taking: Reported on 1/2/2024) 90 capsule 1    sildenafil (Viagra) 100 mg tablet Take by mouth.       No current facility-administered medications on file prior to visit.       Allergies  Patient has no known allergies.     Social History  He reports that he has never smoked. He has never  used smokeless tobacco. He reports that he does not currently use alcohol. He reports that he does not use drugs.    Family History  No family history on file.     Review of Systems   Constitutional:  Negative for chills and fever.   Respiratory:  Negative for cough and shortness of breath.    Cardiovascular:  Negative for chest pain and palpitations.   Gastrointestinal:  Negative for abdominal pain, blood in stool, constipation, diarrhea, nausea and vomiting.   Neurological:  Negative for dizziness and headaches.   All other systems reviewed and are negative.      Last Recorded Vitals  There were no vitals taken for this visit.     Physical Exam  Constitutional:       General: He is not in acute distress.     Appearance: Normal appearance. He is not ill-appearing.   HENT:      Head: Normocephalic and atraumatic.   Cardiovascular:      Rate and Rhythm: Normal rate and regular rhythm.   Pulmonary:      Effort: Pulmonary effort is normal. No respiratory distress.      Breath sounds: Normal breath sounds.   Abdominal:      General: There is no distension.      Palpations: Abdomen is soft.      Tenderness: There is no abdominal tenderness. There is no guarding.   Musculoskeletal:         General: No swelling.   Skin:     General: Skin is warm and dry.   Neurological:      Mental Status: He is alert and oriented to person, place, and time. Mental status is at baseline.   Psychiatric:         Mood and Affect: Mood normal.         Behavior: Behavior normal.         Assessment and Plan  43 y.o. male who presents for his first colonoscopy. The risks and benefits of undergoing colonoscopy were discussed. Risks include COVID-19 transmission/infection, allergic reactions, heart or lung complications, bleeding, infection, injury to surrounding tissue, and perforation of the colon. The patient expressed understanding and provided informed consent for the procedure.     Colonoscopy in Endoscopy 1/24/24. [facility] [employer] was sent  to his pharmacy.    India Franklin MD, FACS  General Surgery

## 2024-01-11 NOTE — H&P (VIEW-ONLY)
GENERAL SURGERY CLINIC NOTE    Vitaliy Acosta   1980   [id number]     History Of Present Illness  Vitaliy Acosta is a 43 y.o. male who presents to the office for evaluation for his first colonoscopy. He saw Dr. Tom for cholelithiasis last year and is currently asymptomatic from that standpoint. He had diarrhea between Christmas and New Year but that has improved.     Past Medical History  He has a past medical history of Essential (primary) hypertension, Other specified disorders of peritoneum, Personal history of other diseases of the digestive system, and Personal history of urinary calculi.    Surgical History  He has a past surgical history that includes Other surgical history (03/09/2020) and Other surgical history (03/09/2020).  No abdominal surgeries    Medications  Current Outpatient Medications on File Prior to Visit   Medication Sig Dispense Refill    allopurinol (Zyloprim) 100 mg tablet Take 2 tablets (200 mg) by mouth once daily. 180 tablet 1    amlodipine-olmesartan (Lucille) 10-40 mg tablet Take 1 tablet by mouth once daily. 90 tablet 1    colchicine 0.6 mg tablet Take 1 tablet (0.6 mg) by mouth 2 times a day as needed for muscle/joint pain. Take two tablets at onset of symptoms and one tablet one hour later. 30 tablet 1    ergocalciferol (Vitamin D-2) 1.25 MG (51540 UT) capsule Take 1 capsule (50,000 Units) by mouth 1 (one) time per week. 12 capsule 1    metoprolol succinate XL (Toprol-XL) 100 mg 24 hr tablet Take 1 tablet (100 mg) by mouth once daily. 90 tablet 1    omeprazole (PriLOSEC) 20 mg DR capsule Take 1 capsule (20 mg) by mouth once daily in the morning. Take before meals. Do not crush or chew. (Patient not taking: Reported on 1/2/2024) 90 capsule 1    sildenafil (Viagra) 100 mg tablet Take by mouth.       No current facility-administered medications on file prior to visit.       Allergies  Patient has no known allergies.     Social History  He reports that he has never smoked. He has never  used smokeless tobacco. He reports that he does not currently use alcohol. He reports that he does not use drugs.    Family History  No family history on file.     Review of Systems   Constitutional:  Negative for chills and fever.   Respiratory:  Negative for cough and shortness of breath.    Cardiovascular:  Negative for chest pain and palpitations.   Gastrointestinal:  Negative for abdominal pain, blood in stool, constipation, diarrhea, nausea and vomiting.   Neurological:  Negative for dizziness and headaches.   All other systems reviewed and are negative.      Last Recorded Vitals  There were no vitals taken for this visit.     Physical Exam  Constitutional:       General: He is not in acute distress.     Appearance: Normal appearance. He is not ill-appearing.   HENT:      Head: Normocephalic and atraumatic.   Cardiovascular:      Rate and Rhythm: Normal rate and regular rhythm.   Pulmonary:      Effort: Pulmonary effort is normal. No respiratory distress.      Breath sounds: Normal breath sounds.   Abdominal:      General: There is no distension.      Palpations: Abdomen is soft.      Tenderness: There is no abdominal tenderness. There is no guarding.   Musculoskeletal:         General: No swelling.   Skin:     General: Skin is warm and dry.   Neurological:      Mental Status: He is alert and oriented to person, place, and time. Mental status is at baseline.   Psychiatric:         Mood and Affect: Mood normal.         Behavior: Behavior normal.         Assessment and Plan  43 y.o. male who presents for his first colonoscopy. The risks and benefits of undergoing colonoscopy were discussed. Risks include COVID-19 transmission/infection, allergic reactions, heart or lung complications, bleeding, infection, injury to surrounding tissue, and perforation of the colon. The patient expressed understanding and provided informed consent for the procedure.     Colonoscopy in Endoscopy 1/24/24. The WebNotes was sent  to his pharmacy.    India Franklin MD, FACS  General Surgery

## 2024-01-23 ENCOUNTER — ANESTHESIA EVENT (OUTPATIENT)
Dept: GASTROENTEROLOGY | Facility: HOSPITAL | Age: 44
End: 2024-01-23
Payer: COMMERCIAL

## 2024-01-24 ENCOUNTER — HOSPITAL ENCOUNTER (OUTPATIENT)
Dept: GASTROENTEROLOGY | Facility: HOSPITAL | Age: 44
Discharge: HOME | End: 2024-01-24
Payer: COMMERCIAL

## 2024-01-24 ENCOUNTER — ANESTHESIA (OUTPATIENT)
Dept: GASTROENTEROLOGY | Facility: HOSPITAL | Age: 44
End: 2024-01-24
Payer: COMMERCIAL

## 2024-01-24 VITALS
WEIGHT: 300 LBS | RESPIRATION RATE: 18 BRPM | OXYGEN SATURATION: 96 % | BODY MASS INDEX: 39.76 KG/M2 | SYSTOLIC BLOOD PRESSURE: 118 MMHG | TEMPERATURE: 96.9 F | HEIGHT: 73 IN | HEART RATE: 62 BPM | DIASTOLIC BLOOD PRESSURE: 83 MMHG

## 2024-01-24 DIAGNOSIS — Z12.11 ENCOUNTER FOR SCREENING FOR MALIGNANT NEOPLASM OF COLON: Primary | ICD-10-CM

## 2024-01-24 PROBLEM — R11.2 PONV (POSTOPERATIVE NAUSEA AND VOMITING): Status: ACTIVE | Noted: 2024-01-24

## 2024-01-24 PROBLEM — Z98.890 PONV (POSTOPERATIVE NAUSEA AND VOMITING): Status: ACTIVE | Noted: 2024-01-24

## 2024-01-24 PROCEDURE — 3700000002 HC GENERAL ANESTHESIA TIME - EACH INCREMENTAL 1 MINUTE: Performed by: SURGERY

## 2024-01-24 PROCEDURE — 2500000004 HC RX 250 GENERAL PHARMACY W/ HCPCS (ALT 636 FOR OP/ED): Performed by: SURGERY

## 2024-01-24 PROCEDURE — 2500000005 HC RX 250 GENERAL PHARMACY W/O HCPCS: Performed by: NURSE ANESTHETIST, CERTIFIED REGISTERED

## 2024-01-24 PROCEDURE — 7100000010 HC PHASE TWO TIME - EACH INCREMENTAL 1 MINUTE: Performed by: SURGERY

## 2024-01-24 PROCEDURE — 88305 TISSUE EXAM BY PATHOLOGIST: CPT | Performed by: STUDENT IN AN ORGANIZED HEALTH CARE EDUCATION/TRAINING PROGRAM

## 2024-01-24 PROCEDURE — 2500000004 HC RX 250 GENERAL PHARMACY W/ HCPCS (ALT 636 FOR OP/ED): Performed by: NURSE ANESTHETIST, CERTIFIED REGISTERED

## 2024-01-24 PROCEDURE — 88305 TISSUE EXAM BY PATHOLOGIST: CPT | Mod: TC,SUR,PORLAB | Performed by: SURGERY

## 2024-01-24 PROCEDURE — 3700000001 HC GENERAL ANESTHESIA TIME - INITIAL BASE CHARGE: Performed by: SURGERY

## 2024-01-24 PROCEDURE — 7100000009 HC PHASE TWO TIME - INITIAL BASE CHARGE: Performed by: SURGERY

## 2024-01-24 PROCEDURE — 45385 COLONOSCOPY W/LESION REMOVAL: CPT | Performed by: SURGERY

## 2024-01-24 RX ORDER — SODIUM CHLORIDE 9 MG/ML
20 INJECTION, SOLUTION INTRAVENOUS CONTINUOUS
Status: DISCONTINUED | OUTPATIENT
Start: 2024-01-24 | End: 2024-01-25 | Stop reason: HOSPADM

## 2024-01-24 RX ORDER — PROPOFOL 10 MG/ML
INJECTION, EMULSION INTRAVENOUS AS NEEDED
Status: DISCONTINUED | OUTPATIENT
Start: 2024-01-24 | End: 2024-01-24

## 2024-01-24 RX ORDER — LIDOCAINE HYDROCHLORIDE 20 MG/ML
INJECTION, SOLUTION INFILTRATION; PERINEURAL AS NEEDED
Status: DISCONTINUED | OUTPATIENT
Start: 2024-01-24 | End: 2024-01-24

## 2024-01-24 RX ADMIN — PROPOFOL 20 MG: 10 INJECTION, EMULSION INTRAVENOUS at 10:06

## 2024-01-24 RX ADMIN — PROPOFOL 30 MG: 10 INJECTION, EMULSION INTRAVENOUS at 10:04

## 2024-01-24 RX ADMIN — PROPOFOL 30 MG: 10 INJECTION, EMULSION INTRAVENOUS at 10:15

## 2024-01-24 RX ADMIN — PROPOFOL 20 MG: 10 INJECTION, EMULSION INTRAVENOUS at 10:20

## 2024-01-24 RX ADMIN — PROPOFOL 20 MG: 10 INJECTION, EMULSION INTRAVENOUS at 10:10

## 2024-01-24 RX ADMIN — PROPOFOL 20 MG: 10 INJECTION, EMULSION INTRAVENOUS at 10:02

## 2024-01-24 RX ADMIN — PROPOFOL 100 MG: 10 INJECTION, EMULSION INTRAVENOUS at 09:59

## 2024-01-24 RX ADMIN — PROPOFOL 20 MG: 10 INJECTION, EMULSION INTRAVENOUS at 10:08

## 2024-01-24 RX ADMIN — LIDOCAINE HYDROCHLORIDE 50 MG: 20 INJECTION, SOLUTION INFILTRATION; PERINEURAL at 09:59

## 2024-01-24 RX ADMIN — SODIUM CHLORIDE 20 ML/HR: 9 INJECTION, SOLUTION INTRAVENOUS at 09:45

## 2024-01-24 RX ADMIN — PROPOFOL 20 MG: 10 INJECTION, EMULSION INTRAVENOUS at 10:12

## 2024-01-24 RX ADMIN — PROPOFOL 50 MG: 10 INJECTION, EMULSION INTRAVENOUS at 10:00

## 2024-01-24 RX ADMIN — PROPOFOL 20 MG: 10 INJECTION, EMULSION INTRAVENOUS at 10:18

## 2024-01-24 RX ADMIN — PROPOFOL 20 MG: 10 INJECTION, EMULSION INTRAVENOUS at 10:22

## 2024-01-24 SDOH — HEALTH STABILITY: MENTAL HEALTH: CURRENT SMOKER: 0

## 2024-01-24 ASSESSMENT — PAIN SCALES - GENERAL
PAINLEVEL_OUTOF10: 0 - NO PAIN
PAIN_LEVEL: 0
PAINLEVEL_OUTOF10: 0 - NO PAIN

## 2024-01-24 ASSESSMENT — PAIN - FUNCTIONAL ASSESSMENT
PAIN_FUNCTIONAL_ASSESSMENT: 0-10

## 2024-01-24 ASSESSMENT — COLUMBIA-SUICIDE SEVERITY RATING SCALE - C-SSRS
1. IN THE PAST MONTH, HAVE YOU WISHED YOU WERE DEAD OR WISHED YOU COULD GO TO SLEEP AND NOT WAKE UP?: NO
2. HAVE YOU ACTUALLY HAD ANY THOUGHTS OF KILLING YOURSELF?: NO
6. HAVE YOU EVER DONE ANYTHING, STARTED TO DO ANYTHING, OR PREPARED TO DO ANYTHING TO END YOUR LIFE?: NO

## 2024-01-24 NOTE — ANESTHESIA PREPROCEDURE EVALUATION
Patient: Vitaliy Acosta    Procedure Information       Date/Time: 01/24/24 1015    Scheduled providers: India Franklin MD    Procedure: COLONOSCOPY    Location:  [facility]            Relevant Problems   Anesthesia   (+) PONV (postoperative nausea and vomiting)      Cardiovascular   (+) HTN (hypertension)   (+) High triglycerides      Endocrine   (+) Obesity      GI   (+) GERD (gastroesophageal reflux disease)      /Renal   (+) Steatosis of liver   (+) Uric acid kidney stone      GI/Hepatic   (+) Calculus of gallbladder with chronic cholecystitis without obstruction   (+) Steatosis of liver       Clinical information reviewed:   Tobacco  Allergies  Meds   Med Hx  Surg Hx   Fam Hx  Soc Hx        NPO Detail:  NPO/Void Status  Date of Last Liquid: 01/24/24  Time of Last Liquid: 0700  Date of Last Solid: 01/22/24  Last Intake Type: Clear fluids         Physical Exam    Airway  Mallampati: II  TM distance: >3 FB  Neck ROM: full  Comments: Mustache and beard present   Cardiovascular - normal exam     Dental - normal exam     Pulmonary - normal exam     Abdominal            Anesthesia Plan    History of general anesthesia?: yes  History of complications of general anesthesia?: yes    ASA 2     MAC     The patient is not a current smoker.    intravenous induction   Anesthetic plan and risks discussed with patient.  Use of blood products discussed with patient who consented to blood products.    Plan discussed with CRNA.

## 2024-01-24 NOTE — ANESTHESIA POSTPROCEDURE EVALUATION
Patient: Vitaliy Acosta    Procedure Summary       Date: 01/24/24 Room / Location: [facility]    Anesthesia Start: 0955 Anesthesia Stop: 1031    Procedure: COLONOSCOPY Diagnosis:       Encounter for screening for malignant neoplasm of colon      Encounter for screening for malignant neoplasm of colon    Scheduled Providers: India Franklin MD Responsible Provider: PITER Rae    Anesthesia Type: MAC ASA Status: 2            Anesthesia Type: MAC    Vitals Value Taken Time   /78 01/24/24 1029   Temp 36.3 °C (97.4 °F) 01/24/24 1029   Pulse 62 01/24/24 1029   Resp 14 01/24/24 1029   SpO2 97 % 01/24/24 1029       Anesthesia Post Evaluation    Patient location during evaluation: PACU  Patient participation: complete - patient participated  Level of consciousness: sleepy but conscious  Pain score: 0  Pain management: adequate  Airway patency: patent  Cardiovascular status: acceptable and hemodynamically stable  Respiratory status: acceptable, spontaneous ventilation and nasal airway  Hydration status: acceptable  Postoperative Nausea and Vomiting: none        There were no known notable events for this encounter.

## 2024-01-25 NOTE — ADDENDUM NOTE
Encounter addended by: Maria De Jesus Webb RN on: 1/25/2024 2:04 PM   Actions taken: Contacts section saved, Flowsheet accepted

## 2024-01-29 LAB
LABORATORY COMMENT REPORT: NORMAL
PATH REPORT.FINAL DX SPEC: NORMAL
PATH REPORT.GROSS SPEC: NORMAL
PATH REPORT.RELEVANT HX SPEC: NORMAL
PATH REPORT.TOTAL CANCER: NORMAL

## 2024-02-02 ENCOUNTER — TELEPHONE (OUTPATIENT)
Dept: SURGERY | Facility: CLINIC | Age: 44
End: 2024-02-02
Payer: COMMERCIAL

## 2024-02-02 NOTE — TELEPHONE ENCOUNTER
----- Message from India Franklin MD sent at 1/31/2024  9:20 AM EST -----  Please let the patient know the results from his colonoscopy. His next is due in 5 years.

## 2024-05-29 ENCOUNTER — TELEPHONE (OUTPATIENT)
Dept: PRIMARY CARE | Facility: CLINIC | Age: 44
End: 2024-05-29
Payer: COMMERCIAL

## 2024-05-29 DIAGNOSIS — M10.9 GOUT, UNSPECIFIED CAUSE, UNSPECIFIED CHRONICITY, UNSPECIFIED SITE: ICD-10-CM

## 2024-05-29 DIAGNOSIS — E55.9 VITAMIN D DEFICIENCY: ICD-10-CM

## 2024-05-29 DIAGNOSIS — I10 HYPERTENSION, UNSPECIFIED TYPE: ICD-10-CM

## 2024-05-29 RX ORDER — METOPROLOL SUCCINATE 100 MG/1
100 TABLET, EXTENDED RELEASE ORAL DAILY
Qty: 30 TABLET | Refills: 0 | Status: SHIPPED | OUTPATIENT
Start: 2024-05-29 | End: 2024-06-07 | Stop reason: SDUPTHER

## 2024-05-29 RX ORDER — AMLODIPINE AND OLMESARTAN MEDOXOMIL 10; 40 MG/1; MG/1
1 TABLET ORAL
Qty: 30 TABLET | Refills: 0 | Status: SHIPPED | OUTPATIENT
Start: 2024-05-29 | End: 2024-06-07 | Stop reason: SDUPTHER

## 2024-05-29 RX ORDER — ALLOPURINOL 100 MG/1
200 TABLET ORAL DAILY
Qty: 60 TABLET | Refills: 0 | Status: SHIPPED | OUTPATIENT
Start: 2024-05-29 | End: 2024-06-07 | Stop reason: SDUPTHER

## 2024-05-29 RX ORDER — ERGOCALCIFEROL 1.25 MG/1
50000 CAPSULE ORAL
Qty: 4 CAPSULE | Refills: 0 | Status: SHIPPED | OUTPATIENT
Start: 2024-06-02 | End: 2024-06-07 | Stop reason: SDUPTHER

## 2024-05-29 NOTE — TELEPHONE ENCOUNTER
Pt left voice mail stating all of his meds will run out just before his NOV with you on 6/7/24. He will need a short term sent in to his local [facility] on file.     NOV 6/7/24  LOV 1/2/24

## 2024-06-07 ENCOUNTER — TELEPHONE (OUTPATIENT)
Dept: PRIMARY CARE | Facility: CLINIC | Age: 44
End: 2024-06-07

## 2024-06-07 ENCOUNTER — OFFICE VISIT (OUTPATIENT)
Dept: PRIMARY CARE | Facility: CLINIC | Age: 44
End: 2024-06-07
Payer: COMMERCIAL

## 2024-06-07 VITALS
SYSTOLIC BLOOD PRESSURE: 128 MMHG | OXYGEN SATURATION: 94 % | WEIGHT: 315 LBS | BODY MASS INDEX: 41.75 KG/M2 | DIASTOLIC BLOOD PRESSURE: 72 MMHG | HEART RATE: 62 BPM | HEIGHT: 73 IN

## 2024-06-07 DIAGNOSIS — M10.9 GOUT, UNSPECIFIED CAUSE, UNSPECIFIED CHRONICITY, UNSPECIFIED SITE: ICD-10-CM

## 2024-06-07 DIAGNOSIS — E55.9 VITAMIN D DEFICIENCY: ICD-10-CM

## 2024-06-07 DIAGNOSIS — K76.0 STEATOSIS OF LIVER: ICD-10-CM

## 2024-06-07 DIAGNOSIS — E78.1 HIGH TRIGLYCERIDES: ICD-10-CM

## 2024-06-07 DIAGNOSIS — I10 HYPERTENSION, UNSPECIFIED TYPE: Primary | ICD-10-CM

## 2024-06-07 DIAGNOSIS — Z13.29 THYROID DISORDER SCREEN: ICD-10-CM

## 2024-06-07 DIAGNOSIS — R73.03 PREDIABETES: ICD-10-CM

## 2024-06-07 PROCEDURE — 3074F SYST BP LT 130 MM HG: CPT | Performed by: NURSE PRACTITIONER

## 2024-06-07 PROCEDURE — 99214 OFFICE O/P EST MOD 30 MIN: CPT | Performed by: NURSE PRACTITIONER

## 2024-06-07 PROCEDURE — 3078F DIAST BP <80 MM HG: CPT | Performed by: NURSE PRACTITIONER

## 2024-06-07 PROCEDURE — 1036F TOBACCO NON-USER: CPT | Performed by: NURSE PRACTITIONER

## 2024-06-07 RX ORDER — METOPROLOL SUCCINATE 100 MG/1
100 TABLET, EXTENDED RELEASE ORAL DAILY
Qty: 90 TABLET | Refills: 3 | Status: SHIPPED | OUTPATIENT
Start: 2024-06-07

## 2024-06-07 RX ORDER — COLCHICINE 0.6 MG/1
0.6 TABLET ORAL 2 TIMES DAILY PRN
Qty: 30 TABLET | Refills: 1 | Status: SHIPPED | OUTPATIENT
Start: 2024-06-07 | End: 2024-06-07 | Stop reason: SDUPTHER

## 2024-06-07 RX ORDER — ALLOPURINOL 100 MG/1
200 TABLET ORAL DAILY
Qty: 180 TABLET | Refills: 3 | Status: SHIPPED | OUTPATIENT
Start: 2024-06-07

## 2024-06-07 RX ORDER — ERGOCALCIFEROL 1.25 MG/1
50000 CAPSULE ORAL
Qty: 12 CAPSULE | Refills: 3 | Status: SHIPPED | OUTPATIENT
Start: 2024-06-09

## 2024-06-07 RX ORDER — COLCHICINE 0.6 MG/1
1.2 TABLET ORAL SEE ADMIN INSTRUCTIONS
Qty: 30 TABLET | Refills: 1 | Status: SHIPPED | OUTPATIENT
Start: 2024-06-07

## 2024-06-07 RX ORDER — AMLODIPINE AND OLMESARTAN MEDOXOMIL 10; 40 MG/1; MG/1
1 TABLET ORAL
Qty: 90 TABLET | Refills: 3 | Status: SHIPPED | OUTPATIENT
Start: 2024-06-07

## 2024-06-07 ASSESSMENT — ENCOUNTER SYMPTOMS
CARDIOVASCULAR NEGATIVE: 1
BACK PAIN: 1
NEUROLOGICAL NEGATIVE: 1
RESPIRATORY NEGATIVE: 1
FATIGUE: 1
DECREASED CONCENTRATION: 1

## 2024-06-07 NOTE — PATIENT INSTRUCTIONS
Lab orders to be completed for next appointment review. Labs are fasting 10-12 hours do not eat, please drink adequate water prior to lab draw.

## 2024-06-07 NOTE — PROGRESS NOTES
"Subjective   Patient ID: Vitaliy Acosta is a 43 y.o. male who presents for Follow-up (6m ).    HPI   Patient here for routine follow up. Last office visit acute on 01/02/2024  Current concerns  Chronic concerns: HTN, uric acid kidney stone , ED, Gout, vitamin D deficiency, Prediabetes, Diverticulosis   Specialist- None  Labs : 12/27/2023 - Need to update  NON SMOKER    Review of Systems   Constitutional:  Positive for fatigue.   Respiratory: Negative.     Cardiovascular: Negative.    Gastrointestinal:         Unable to eat a lot of anything    Genitourinary: Negative.    Musculoskeletal:  Positive for back pain.   Neurological: Negative.    Psychiatric/Behavioral:  Positive for decreased concentration (hard to stay focus).      Objective   /72 (BP Location: Right arm, Patient Position: Sitting, BP Cuff Size: Large adult)   Pulse 62   Ht 1.854 m (6' 1\")   Wt 144 kg (316 lb 6.4 oz)   SpO2 94%   BMI 41.74 kg/m²   Weight in January 316 lbs     Physical Exam  Vitals reviewed.   Constitutional:       Appearance: He is obese.   Neck:      Thyroid: No thyromegaly.      Vascular: No carotid bruit.   Cardiovascular:      Rate and Rhythm: Normal rate and regular rhythm.      Heart sounds: Normal heart sounds.   Pulmonary:      Effort: Pulmonary effort is normal.      Breath sounds: Normal breath sounds.   Abdominal:      General: Bowel sounds are normal.      Palpations: Abdomen is soft.      Tenderness: There is no abdominal tenderness.   Musculoskeletal:      Cervical back: Neck supple.   Skin:     General: Skin is warm.   Neurological:      General: No focal deficit present.      Mental Status: He is alert.      Coordination: Coordination is intact.      Gait: Gait is intact.   Psychiatric:         Attention and Perception: Attention normal.         Speech: Speech normal.         Behavior: Behavior normal. Behavior is cooperative.       Assessment/Plan     Labs- 12/27/2023    Influenza- declines  Prevnar 13/20- " not indicated   Shingrix- not indicated   Colonoscopy- 01/24/2024-General Surgery (Dr Franklin) diverticulosis mild & sub centimeter polyp.  PSA - not indicated at this time  Diagnoses and all orders for this visit:  Hypertension, unspecified type- well controlled   - refilled metoprolol succinate XL (Toprol-XL) 100 mg 24 hr tablet; Take 1 tablet (100 mg) by mouth once daily.  - refilled  amlodipine-olmesartan (Lucille) 10-40 mg tablet; Take 1 tablet by mouth once daily.  -     CBC and Auto Differential; Future  -     Albumin , Urine Random; Future  Gout, unspecified cause, unspecified chronicity, unspecified site  - refilled  allopurinol (Zyloprim) 100 mg tablet; Take 2 tablets (200 mg) by mouth once daily.  -     Uric acid; Future  Vitamin D deficiency  -  refilled  ergocalciferol (Vitamin D-2) 1.25 MG (04760 UT) capsule; Take 1 capsule (50,000 Units) by mouth 1 (one) time per week.  -     Vitamin D 25-Hydroxy,Total (for eval of Vitamin D levels); Future  Prediabetes  -     Hemoglobin A1C; Future  High triglycerides  -     Lipid Panel; Future  Steatosis of liver  -     Comprehensive Metabolic Panel; Future  Thyroid disorder screen  -     TSH with reflex to Free T4 if abnormal; Future     PLAN: follow up 6 months  Labs to be completed for review at next appt.

## 2024-06-07 NOTE — TELEPHONE ENCOUNTER
Pharmacy called stating there's 2 different directions on patients rx Colchicine. They would like to know which set of directions they're to put on the label.     Take 1 tab PO BID, PRN  Or   Take 2 tabs at onset of sx and 1 tab PO 1 hour later?

## 2024-09-10 ENCOUNTER — APPOINTMENT (OUTPATIENT)
Dept: PRIMARY CARE | Facility: CLINIC | Age: 44
End: 2024-09-10
Payer: COMMERCIAL

## 2024-09-19 ENCOUNTER — APPOINTMENT (OUTPATIENT)
Dept: PRIMARY CARE | Facility: CLINIC | Age: 44
End: 2024-09-19
Payer: COMMERCIAL

## 2024-12-06 ENCOUNTER — APPOINTMENT (OUTPATIENT)
Dept: PRIMARY CARE | Facility: CLINIC | Age: 44
End: 2024-12-06
Payer: COMMERCIAL

## 2024-12-10 ENCOUNTER — LAB (OUTPATIENT)
Dept: LAB | Facility: LAB | Age: 44
End: 2024-12-10
Payer: COMMERCIAL

## 2024-12-10 DIAGNOSIS — I10 HYPERTENSION, UNSPECIFIED TYPE: ICD-10-CM

## 2024-12-10 DIAGNOSIS — E55.9 VITAMIN D DEFICIENCY: ICD-10-CM

## 2024-12-10 DIAGNOSIS — M10.9 GOUT, UNSPECIFIED CAUSE, UNSPECIFIED CHRONICITY, UNSPECIFIED SITE: ICD-10-CM

## 2024-12-10 DIAGNOSIS — R73.03 PREDIABETES: ICD-10-CM

## 2024-12-10 DIAGNOSIS — K76.0 STEATOSIS OF LIVER: ICD-10-CM

## 2024-12-10 DIAGNOSIS — Z13.29 THYROID DISORDER SCREEN: ICD-10-CM

## 2024-12-10 DIAGNOSIS — E78.1 HIGH TRIGLYCERIDES: ICD-10-CM

## 2024-12-10 LAB
25(OH)D3 SERPL-MCNC: 19 NG/ML (ref 30–100)
ALBUMIN SERPL BCP-MCNC: 4.4 G/DL (ref 3.4–5)
ALP SERPL-CCNC: 61 U/L (ref 33–120)
ALT SERPL W P-5'-P-CCNC: 60 U/L (ref 10–52)
ANION GAP SERPL CALC-SCNC: 13 MMOL/L (ref 10–20)
AST SERPL W P-5'-P-CCNC: 43 U/L (ref 9–39)
BASOPHILS # BLD AUTO: 0.02 X10*3/UL (ref 0–0.1)
BASOPHILS NFR BLD AUTO: 0.4 %
BILIRUB SERPL-MCNC: 0.8 MG/DL (ref 0–1.2)
BUN SERPL-MCNC: 13 MG/DL (ref 6–23)
CALCIUM SERPL-MCNC: 9 MG/DL (ref 8.6–10.3)
CHLORIDE SERPL-SCNC: 105 MMOL/L (ref 98–107)
CHOLEST SERPL-MCNC: 146 MG/DL (ref 0–199)
CHOLESTEROL/HDL RATIO: 4.6
CO2 SERPL-SCNC: 24 MMOL/L (ref 21–32)
CREAT SERPL-MCNC: 0.83 MG/DL (ref 0.5–1.3)
CREAT UR-MCNC: 127 MG/DL (ref 20–370)
EGFRCR SERPLBLD CKD-EPI 2021: >90 ML/MIN/1.73M*2
EOSINOPHIL # BLD AUTO: 0.1 X10*3/UL (ref 0–0.7)
EOSINOPHIL NFR BLD AUTO: 2 %
ERYTHROCYTE [DISTWIDTH] IN BLOOD BY AUTOMATED COUNT: 12.9 % (ref 11.5–14.5)
EST. AVERAGE GLUCOSE BLD GHB EST-MCNC: 128 MG/DL
GLUCOSE SERPL-MCNC: 132 MG/DL (ref 74–99)
HBA1C MFR BLD: 6.1 %
HCT VFR BLD AUTO: 44.7 % (ref 41–52)
HDLC SERPL-MCNC: 31.9 MG/DL
HGB BLD-MCNC: 15.6 G/DL (ref 13.5–17.5)
IMM GRANULOCYTES # BLD AUTO: 0.01 X10*3/UL (ref 0–0.7)
IMM GRANULOCYTES NFR BLD AUTO: 0.2 % (ref 0–0.9)
LDLC SERPL CALC-MCNC: 71 MG/DL
LYMPHOCYTES # BLD AUTO: 1.48 X10*3/UL (ref 1.2–4.8)
LYMPHOCYTES NFR BLD AUTO: 30.2 %
MCH RBC QN AUTO: 30.9 PG (ref 26–34)
MCHC RBC AUTO-ENTMCNC: 34.9 G/DL (ref 32–36)
MCV RBC AUTO: 89 FL (ref 80–100)
MICROALBUMIN UR-MCNC: <7 MG/L
MICROALBUMIN/CREAT UR: NORMAL MG/G{CREAT}
MONOCYTES # BLD AUTO: 0.36 X10*3/UL (ref 0.1–1)
MONOCYTES NFR BLD AUTO: 7.3 %
NEUTROPHILS # BLD AUTO: 2.93 X10*3/UL (ref 1.2–7.7)
NEUTROPHILS NFR BLD AUTO: 59.9 %
NON HDL CHOLESTEROL: 114 MG/DL (ref 0–149)
NRBC BLD-RTO: 0 /100 WBCS (ref 0–0)
PLATELET # BLD AUTO: 142 X10*3/UL (ref 150–450)
POTASSIUM SERPL-SCNC: 4.2 MMOL/L (ref 3.5–5.3)
PROT SERPL-MCNC: 6.8 G/DL (ref 6.4–8.2)
RBC # BLD AUTO: 5.05 X10*6/UL (ref 4.5–5.9)
SODIUM SERPL-SCNC: 138 MMOL/L (ref 136–145)
TRIGL SERPL-MCNC: 215 MG/DL (ref 0–149)
TSH SERPL-ACNC: 1.36 MIU/L (ref 0.44–3.98)
URATE SERPL-MCNC: 6.7 MG/DL (ref 4–7.5)
VLDL: 43 MG/DL (ref 0–40)
WBC # BLD AUTO: 4.9 X10*3/UL (ref 4.4–11.3)

## 2024-12-10 PROCEDURE — 82306 VITAMIN D 25 HYDROXY: CPT

## 2024-12-10 PROCEDURE — 80053 COMPREHEN METABOLIC PANEL: CPT

## 2024-12-10 PROCEDURE — 82570 ASSAY OF URINE CREATININE: CPT

## 2024-12-10 PROCEDURE — 85025 COMPLETE CBC W/AUTO DIFF WBC: CPT

## 2024-12-10 PROCEDURE — 84443 ASSAY THYROID STIM HORMONE: CPT

## 2024-12-10 PROCEDURE — 80061 LIPID PANEL: CPT

## 2024-12-10 PROCEDURE — 83036 HEMOGLOBIN GLYCOSYLATED A1C: CPT

## 2024-12-10 PROCEDURE — 36415 COLL VENOUS BLD VENIPUNCTURE: CPT

## 2024-12-10 PROCEDURE — 84550 ASSAY OF BLOOD/URIC ACID: CPT

## 2024-12-10 PROCEDURE — 82043 UR ALBUMIN QUANTITATIVE: CPT

## 2024-12-12 ENCOUNTER — APPOINTMENT (OUTPATIENT)
Dept: PRIMARY CARE | Facility: CLINIC | Age: 44
End: 2024-12-12
Payer: COMMERCIAL

## 2024-12-12 VITALS
DIASTOLIC BLOOD PRESSURE: 70 MMHG | OXYGEN SATURATION: 95 % | HEIGHT: 73 IN | WEIGHT: 315 LBS | SYSTOLIC BLOOD PRESSURE: 114 MMHG | HEART RATE: 68 BPM | BODY MASS INDEX: 41.75 KG/M2

## 2024-12-12 DIAGNOSIS — M10.9 GOUT, UNSPECIFIED CAUSE, UNSPECIFIED CHRONICITY, UNSPECIFIED SITE: ICD-10-CM

## 2024-12-12 DIAGNOSIS — R74.8 ELEVATED LIVER ENZYMES: ICD-10-CM

## 2024-12-12 DIAGNOSIS — I10 HYPERTENSION, UNSPECIFIED TYPE: Primary | ICD-10-CM

## 2024-12-12 DIAGNOSIS — R73.03 PREDIABETES: ICD-10-CM

## 2024-12-12 DIAGNOSIS — E78.1 HIGH TRIGLYCERIDES: ICD-10-CM

## 2024-12-12 DIAGNOSIS — E55.9 VITAMIN D DEFICIENCY: ICD-10-CM

## 2024-12-12 PROCEDURE — 3008F BODY MASS INDEX DOCD: CPT | Performed by: NURSE PRACTITIONER

## 2024-12-12 PROCEDURE — 1036F TOBACCO NON-USER: CPT | Performed by: NURSE PRACTITIONER

## 2024-12-12 PROCEDURE — 3078F DIAST BP <80 MM HG: CPT | Performed by: NURSE PRACTITIONER

## 2024-12-12 PROCEDURE — 99214 OFFICE O/P EST MOD 30 MIN: CPT | Performed by: NURSE PRACTITIONER

## 2024-12-12 PROCEDURE — 3074F SYST BP LT 130 MM HG: CPT | Performed by: NURSE PRACTITIONER

## 2024-12-12 RX ORDER — COLCHICINE 0.6 MG/1
1.2 TABLET ORAL SEE ADMIN INSTRUCTIONS
Qty: 30 TABLET | Refills: 1 | Status: SHIPPED | OUTPATIENT
Start: 2024-12-12

## 2024-12-12 RX ORDER — ALLOPURINOL 100 MG/1
200 TABLET ORAL DAILY
Qty: 180 TABLET | Refills: 3 | Status: SHIPPED | OUTPATIENT
Start: 2024-12-12

## 2024-12-12 ASSESSMENT — ENCOUNTER SYMPTOMS
RESPIRATORY NEGATIVE: 1
HEMATOLOGIC/LYMPHATIC NEGATIVE: 1
CONSTITUTIONAL NEGATIVE: 1
CARDIOVASCULAR NEGATIVE: 1
ABDOMINAL PAIN: 0
ARTHRALGIAS: 1
NEUROLOGICAL NEGATIVE: 1
MYALGIAS: 1

## 2024-12-12 NOTE — PROGRESS NOTES
"Subjective   Patient ID: Vitaliy Acosta is a 44 y.o. male who presents for Follow-up (6m/Lov 6/7/24/Labs 12/10/124).    HPI   Patient here for routine follow up. Last office visit on 06/07/2024  Current concerns:  1)  abdominal pain- trouble eating most foods not made at home, especially concerning when eating at [employer]. Abdominal pain- not severe left lower quadrant.   Chronic concerns: HTN, uric acid kidney stone , ED, Gout, vitamin D deficiency, Prediabetes, Diverticulosis   Specialist- None  Labs : 12/10/2024  NON SMOKER  No alcohol     Review of Systems   Constitutional: Negative.    HENT: Negative.     Respiratory: Negative.     Cardiovascular: Negative.    Gastrointestinal:  Negative for abdominal pain (intermittent left lower abdomen, \"just uncomfortable\").   Genitourinary: Negative.    Musculoskeletal:  Positive for arthralgias (feet) and myalgias.        Not gout flare-up since last appt    Neurological: Negative.    Hematological: Negative.      Objective   /70 (BP Location: Left arm, Patient Position: Sitting, BP Cuff Size: Large adult)   Pulse 68   Ht 1.854 m (6' 1\")   Wt 146 kg (321 lb 12.8 oz)   SpO2 95%   BMI 42.46 kg/m²   Weight in June  316.6 lbs    Physical Exam  Vitals reviewed.   Constitutional:       Appearance: He is obese.   Neck:      Thyroid: No thyromegaly.      Vascular: No carotid bruit.   Cardiovascular:      Rate and Rhythm: Normal rate and regular rhythm.   Pulmonary:      Effort: Pulmonary effort is normal.      Breath sounds: Normal breath sounds.   Abdominal:      Palpations: Abdomen is soft.   Musculoskeletal:         General: Normal range of motion.      Cervical back: Neck supple.   Skin:     General: Skin is warm.      Findings: No lesion.   Neurological:      General: No focal deficit present.      Mental Status: He is alert.   Psychiatric:         Mood and Affect: Mood normal.         Behavior: Behavior normal.         Judgment: Judgment normal.       Assessment/Plan "   Labs- 12/10/2024- reviewed with patient    Influenza- declined  Prevnar 13/20- not indicated   Shingrix- not indicated   Colonoscopy- 01/24/2024-General Surgery (Dr Franklin) diverticulosis mild & sub centimeter polyp.  PSA - not indicated at this time  Diagnoses and all orders for this visit:  Hypertension, unspecified type  Well controlled. No refills needed on Metoprolol succinate  mg 24 hour or Amlodipine-Olmesartan 10- 40 mg every day   Vitamin D deficiency  12/10/2024 lever 19 patient had started on rx vitamin D2 50,000 unit once weekly then pharmacy did not fill so he no longer was taking. Recommended vitamin D3 5000 unit every day (buy otc)  High triglycerides  12/10/2024  lipid panel Total cholesterol 146, HDL 31, LDL 71, triglycerides 215 (up from 137) patient reports to eating a lot more sweets (cookies) Discussed dietary and lifestyle modifications  Elevated liver enzymes  12/10/2024  ALT 60 (previously 52) AST 43 (previously 45) Dx with fatty liver (patient reports since 1997)  -     Hepatitis C Antibody; Future  -     Hepatitis B surface antigen; Future  -     Iron and TIBC; Future  -     Ferritin; Future  -     Bilirubin, direct; Future  Gout, unspecified cause, unspecified chronicity, unspecified site  12/10/2024  uric acid 6.7 stable, no recent flare-up  - refilled  allopurinol (Zyloprim) 100 mg tablet; Take 2 tablets (200 mg) by mouth once daily.  -  refilled   colchicine 0.6 mg tablet; Take 2 tablets (1.2 mg) by mouth see administration instructions. Take two tablets at onset of symptoms and one tablet one hour later.  Prediabetes  12/10/2024  A1C 6.1%, improved from 6.3%    PLAN: follow up 6 months  Lab orders to check elevated liver enzymes- complete in next month or so  Take otc Vitamin D3 5000 units daily

## 2024-12-12 NOTE — PATIENT INSTRUCTIONS
Vitamin D 3 5000 units  once daily     Lab orders to check elevated liver enzymes- complete in next month or so- no fasting required.

## 2025-01-20 ENCOUNTER — LAB (OUTPATIENT)
Dept: LAB | Facility: LAB | Age: 45
End: 2025-01-20
Payer: COMMERCIAL

## 2025-01-20 DIAGNOSIS — R74.8 ELEVATED LIVER ENZYMES: ICD-10-CM

## 2025-01-20 LAB
BILIRUB DIRECT SERPL-MCNC: 0.1 MG/DL (ref 0–0.3)
FERRITIN SERPL-MCNC: 545 NG/ML (ref 20–300)
HBV SURFACE AG SERPL QL IA: NONREACTIVE
HCV AB SER QL: NONREACTIVE
IRON SATN MFR SERPL: 41 % (ref 25–45)
IRON SERPL-MCNC: 130 UG/DL (ref 35–150)
TIBC SERPL-MCNC: 316 UG/DL (ref 240–445)
UIBC SERPL-MCNC: 186 UG/DL (ref 110–370)

## 2025-01-20 PROCEDURE — 86803 HEPATITIS C AB TEST: CPT

## 2025-01-20 PROCEDURE — 82248 BILIRUBIN DIRECT: CPT

## 2025-01-20 PROCEDURE — 82728 ASSAY OF FERRITIN: CPT

## 2025-01-20 PROCEDURE — 87340 HEPATITIS B SURFACE AG IA: CPT

## 2025-01-20 PROCEDURE — 83550 IRON BINDING TEST: CPT

## 2025-01-20 PROCEDURE — 83540 ASSAY OF IRON: CPT

## 2025-01-21 DIAGNOSIS — K76.0 STEATOSIS OF LIVER: ICD-10-CM

## 2025-01-21 DIAGNOSIS — R74.8 ELEVATED LIVER ENZYMES: Primary | ICD-10-CM

## 2025-01-21 DIAGNOSIS — R79.89 ELEVATED FERRITIN: ICD-10-CM

## 2025-05-29 ENCOUNTER — APPOINTMENT (OUTPATIENT)
Dept: PRIMARY CARE | Facility: CLINIC | Age: 45
End: 2025-05-29
Payer: COMMERCIAL

## 2025-05-29 VITALS
HEART RATE: 76 BPM | WEIGHT: 276.6 LBS | HEIGHT: 73 IN | SYSTOLIC BLOOD PRESSURE: 118 MMHG | DIASTOLIC BLOOD PRESSURE: 78 MMHG | BODY MASS INDEX: 36.66 KG/M2 | OXYGEN SATURATION: 97 %

## 2025-05-29 DIAGNOSIS — E78.1 HIGH TRIGLYCERIDES: ICD-10-CM

## 2025-05-29 DIAGNOSIS — I10 HYPERTENSION, UNSPECIFIED TYPE: Primary | ICD-10-CM

## 2025-05-29 DIAGNOSIS — R73.03 PREDIABETES: ICD-10-CM

## 2025-05-29 DIAGNOSIS — M10.9 GOUT, UNSPECIFIED CAUSE, UNSPECIFIED CHRONICITY, UNSPECIFIED SITE: ICD-10-CM

## 2025-05-29 PROCEDURE — 3074F SYST BP LT 130 MM HG: CPT | Performed by: NURSE PRACTITIONER

## 2025-05-29 PROCEDURE — 3008F BODY MASS INDEX DOCD: CPT | Performed by: NURSE PRACTITIONER

## 2025-05-29 PROCEDURE — 3078F DIAST BP <80 MM HG: CPT | Performed by: NURSE PRACTITIONER

## 2025-05-29 PROCEDURE — 99214 OFFICE O/P EST MOD 30 MIN: CPT | Performed by: NURSE PRACTITIONER

## 2025-05-29 PROCEDURE — 1036F TOBACCO NON-USER: CPT | Performed by: NURSE PRACTITIONER

## 2025-05-29 RX ORDER — ALLOPURINOL 100 MG/1
200 TABLET ORAL DAILY
Qty: 180 TABLET | Refills: 3 | Status: SHIPPED | OUTPATIENT
Start: 2025-05-29

## 2025-05-29 RX ORDER — AMLODIPINE BESYLATE AND OLMESARTAN MEDOXOMIL 10; 40 MG/1; MG/1
1 TABLET ORAL
Qty: 90 TABLET | Refills: 3 | Status: SHIPPED | OUTPATIENT
Start: 2025-05-29

## 2025-05-29 ASSESSMENT — ENCOUNTER SYMPTOMS
RESPIRATORY NEGATIVE: 1
CARDIOVASCULAR NEGATIVE: 1
ARTHRALGIAS: 0
PSYCHIATRIC NEGATIVE: 1
NEUROLOGICAL NEGATIVE: 1
GASTROINTESTINAL NEGATIVE: 1
FATIGUE: 0

## 2025-05-29 NOTE — PROGRESS NOTES
"Subjective   Patient ID: Vitaliy Acosta is a 44 y.o. male who presents for Follow-up (6m with labs).    HPI   Patient here for follow up routine. Last office visit on 06/07/2024  Patient has lost 40 lbs since last June. Started to see chiropractor who recommended supplement and cut out dairy and bread.    Ate lean protein and vegetables. Then did low protein for few weeks then back to lean protein and vegetables. No issues with gout and joints feel better. He also discontinued Metoprolol d/t BP was running low on self checks at home, continues to take Amlodipine- Olmesartan and allopurinol as prescribed.   Current concerns: NONE  Chronic concerns: HTN, uric acid kidney stone , ED, Gout, vitamin D deficiency, Prediabetes, Diverticulosis, Dyslipidemia   Specialist- None  Labs : 01/20/2025  NON SMOKER    Review of Systems   Constitutional:  Negative for fatigue.   Respiratory: Negative.     Cardiovascular: Negative.    Gastrointestinal: Negative.         Digestive issues have resolved (gallbladder)    Genitourinary: Negative.    Musculoskeletal:  Negative for arthralgias.        Joints feel better since weight loss   Skin: Negative.    Neurological: Negative.    Psychiatric/Behavioral: Negative.       Objective   /78   Pulse 76   Ht 1.854 m (6' 1\")   Wt 125 kg (276 lb 9.6 oz)   SpO2 97%   BMI 36.49 kg/m²   Weight in June 2024 316.6 lbs   (40 lbs weight loss!) goal is 230 lbs     Physical Exam  Vitals reviewed.   Neck:      Thyroid: No thyromegaly.      Vascular: No carotid bruit.   Cardiovascular:      Rate and Rhythm: Normal rate and regular rhythm.      Heart sounds: Normal heart sounds.   Pulmonary:      Effort: Pulmonary effort is normal.      Breath sounds: Normal breath sounds.   Musculoskeletal:      Cervical back: Neck supple.   Skin:     General: Skin is warm.   Neurological:      General: No focal deficit present.      Mental Status: He is alert.   Psychiatric:         Mood and Affect: Mood normal. "       Assessment/Plan   Labs- 01/20/2025    Influenza- declined  Prevnar 13/20- not indicated   Shingrix- not indicated   Colonoscopy- 01/24/2024-General Surgery (Dr Franklin) diverticulosis mild & sub centimeter polyp.  PSA - not indicated at this time    Diagnoses and all orders for this visit:  Hypertension, unspecified type  BP well controlled, patient had discontinued use of  Metoprolol succinate 100 mg 24 hr tablet d/t decreased BP with weight loss, denies any concerning side effects.  -  refilled  amlodipine-olmesartan (Lucille) 10-40 mg tablet; Take 1 tablet by mouth once daily.  Gout, unspecified cause, unspecified chronicity, unspecified site  No recent gout flare-ups, not needing  Colchicine   - refilled   allopurinol (Zyloprim) 100 mg tablet; Take 2 tablets (200 mg) by mouth once daily.  Prediabetes  12/10/2024 A1C 6.1%  -     Hemoglobin A1C; Future  High triglycerides  12/10/2024 total cholesterol 146, HDL 31, triglcerides 215, LDL 71.   -     Lipid Panel; Future     PLAN: Follow up 6 months wellness   Lab orders for liver enzymes, iron, ferritin, lipids and A1C to be completed soon. -> communicate through MY CHART

## 2025-06-30 LAB
ALT SERPL-CCNC: 18 U/L (ref 9–46)
AST SERPL-CCNC: 16 U/L (ref 10–40)
CHOLEST SERPL-MCNC: 152 MG/DL
CHOLEST/HDLC SERPL: 4.1 (CALC)
ERYTHROCYTE [DISTWIDTH] IN BLOOD BY AUTOMATED COUNT: 12.6 % (ref 11–15)
EST. AVERAGE GLUCOSE BLD GHB EST-MCNC: 105 MG/DL
EST. AVERAGE GLUCOSE BLD GHB EST-SCNC: 5.8 MMOL/L
FERRITIN SERPL-MCNC: 210 NG/ML (ref 38–380)
HBA1C MFR BLD: 5.3 %
HCT VFR BLD AUTO: 48 % (ref 38.5–50)
HDLC SERPL-MCNC: 37 MG/DL
HGB BLD-MCNC: 16.1 G/DL (ref 13.2–17.1)
IRON SATN MFR SERPL: 32 % (CALC) (ref 20–48)
IRON SERPL-MCNC: 91 MCG/DL (ref 50–180)
LDLC SERPL CALC-MCNC: 92 MG/DL (CALC)
MCH RBC QN AUTO: 30.8 PG (ref 27–33)
MCHC RBC AUTO-ENTMCNC: 33.5 G/DL (ref 32–36)
MCV RBC AUTO: 92 FL (ref 80–100)
NONHDLC SERPL-MCNC: 115 MG/DL (CALC)
PLATELET # BLD AUTO: 132 THOUSAND/UL (ref 140–400)
PMV BLD REES-ECKER: 12.3 FL (ref 7.5–12.5)
RBC # BLD AUTO: 5.22 MILLION/UL (ref 4.2–5.8)
TIBC SERPL-MCNC: 288 MCG/DL (CALC) (ref 250–425)
TRIGL SERPL-MCNC: 136 MG/DL
WBC # BLD AUTO: 5 THOUSAND/UL (ref 3.8–10.8)

## 2025-07-16 ENCOUNTER — APPOINTMENT (OUTPATIENT)
Dept: PRIMARY CARE | Facility: CLINIC | Age: 45
End: 2025-07-16
Payer: COMMERCIAL

## 2025-07-16 VITALS
OXYGEN SATURATION: 98 % | BODY MASS INDEX: 36.71 KG/M2 | HEIGHT: 73 IN | DIASTOLIC BLOOD PRESSURE: 82 MMHG | WEIGHT: 277 LBS | HEART RATE: 73 BPM | SYSTOLIC BLOOD PRESSURE: 124 MMHG

## 2025-07-16 DIAGNOSIS — M10.9 GOUT, UNSPECIFIED CAUSE, UNSPECIFIED CHRONICITY, UNSPECIFIED SITE: ICD-10-CM

## 2025-07-16 DIAGNOSIS — E55.9 VITAMIN D DEFICIENCY: ICD-10-CM

## 2025-07-16 DIAGNOSIS — L29.9 LOCALIZED PRURITUS: ICD-10-CM

## 2025-07-16 DIAGNOSIS — E78.1 HIGH TRIGLYCERIDES: ICD-10-CM

## 2025-07-16 DIAGNOSIS — R21 RASH OF BOTH FEET: Primary | ICD-10-CM

## 2025-07-16 DIAGNOSIS — Z13.21 ENCOUNTER FOR VITAMIN DEFICIENCY SCREENING: ICD-10-CM

## 2025-07-16 PROCEDURE — 99213 OFFICE O/P EST LOW 20 MIN: CPT | Performed by: NURSE PRACTITIONER

## 2025-07-16 PROCEDURE — 1036F TOBACCO NON-USER: CPT | Performed by: NURSE PRACTITIONER

## 2025-07-16 PROCEDURE — 3074F SYST BP LT 130 MM HG: CPT | Performed by: NURSE PRACTITIONER

## 2025-07-16 PROCEDURE — 3008F BODY MASS INDEX DOCD: CPT | Performed by: NURSE PRACTITIONER

## 2025-07-16 PROCEDURE — 3079F DIAST BP 80-89 MM HG: CPT | Performed by: NURSE PRACTITIONER

## 2025-07-16 ASSESSMENT — ENCOUNTER SYMPTOMS
JOINT SWELLING: 0
CONSTITUTIONAL NEGATIVE: 1
MYALGIAS: 1

## 2025-07-16 NOTE — PROGRESS NOTES
"Subjective   Patient ID: Vitaliy Acosta is a 44 y.o. male who presents for Itching (Itching with feet, dark spots, tingling and swelling. Dark spots move around. It's not constant. He's been using athletes foot soap, lotion. No specific time of day, can occur whenever throughout the day. He wears  leather boots for work. Has been going on x 3-4 months. /Lov 5/29/25/Labs 6/30/25/Nov 12/1/25 6m).    HPI   Patient here for ongoing concern. Last office visit on 05/29/2025  Current concern:  1) feet itching, dark spots that change location, tingling and swelling for 3 months possibly longer?.  Has rather new orthotics from chiropractor that have not helped with foot issue.    2) patient requesting specific lab work checked (for Chiropractor)  Folate, vitamin B12, Homocysteine, uric acid    Would like a vitamin screening.   Chronic concerns: HTN, uric acid kidney stone , ED, Gout, vitamin D deficiency, Diverticulosis, Dyslipidemia   Specialist  - chiropractor   Labs : 01/20/2025  NON SMOKER    Review of Systems   Constitutional: Negative.    Musculoskeletal:  Positive for gait problem and myalgias. Negative for joint swelling.   Skin:  Positive for rash.   All other systems reviewed and are negative.      Objective   /82 (BP Location: Right arm)   Pulse 73   Ht 1.854 m (6' 1\")   Wt 126 kg (277 lb)   SpO2 98%   BMI 36.55 kg/m²   Weight in May 276.9 lbs   Physical Exam  Constitutional:       General: He is not in acute distress.    Musculoskeletal:      Right foot: Normal range of motion.      Left foot: Normal range of motion.        Feet:    Feet:      Right foot:      Toenail Condition: Right toenails are normal.      Comments: Top of feet (see diagram) area of rash- faint speckled brown  Bottom of feet (diagram) areas of tenderness to palpation, no erythema or swelling visible on exam.     Skin:     Findings: Rash present.     Psychiatric:         Mood and Affect: Mood normal.         Behavior: Behavior " normal.         Assessment/Plan   Labs- 01/20/2025 06/30/2025  labs Prediabetes reversed with A1C 5.3% (06/30/2025)  Lipids improved Lipid panel much improved from December result. Triglycerides have decreased from 215 (12/2024)-> now 136, under 150 is the goal! LDL (bad cholesterol) also improved from 92-> 71,   Influenza- declined  Prevnar 13/20- not indicated   Shingrix- not indicated   Colonoscopy- 01/24/2024-General Surgery (Dr Franklin) diverticulosis mild & sub centimeter polyp.  PSA - not indicated at this time    Diagnoses and all orders for this visit:  Rash of both feet / Localized pruritus  -     Referral to Podiatry; Future   Patient requested Podiatry in [place]   Plans to schedule with Dr Joseluis Stephens [phone]  [facility] [address] [place] [address] [address] [facility] [id number]  Vitamin D deficiency  / Encounter for vitamin deficiency screening  -     Vitamin D 25-Hydroxy,Total (for eval of Vitamin D levels); Future  -     Vitamin B12; Future  -     Folate; Future  High triglycerides  -     Homocysteine, serum; Future  Gout, unspecified cause, unspecified chronicity, unspecified site  -     Homocysteine, serum; Future  -     Uric acid; Future    PLAN: follow up as scheduled in December

## 2025-07-16 NOTE — PATIENT INSTRUCTIONS
Podiatry in [place]   Plans to schedule with Dr Joseluis Stephens [phone]  [facility] [address] [place] [address] [address] [facility] [address]    Labs: Folate, vitamin B12, Homocysteine, uric acid

## 2025-12-01 ENCOUNTER — APPOINTMENT (OUTPATIENT)
Dept: PRIMARY CARE | Facility: CLINIC | Age: 45
End: 2025-12-01
Payer: COMMERCIAL